# Patient Record
Sex: FEMALE | Race: WHITE | HISPANIC OR LATINO | Employment: FULL TIME | URBAN - METROPOLITAN AREA
[De-identification: names, ages, dates, MRNs, and addresses within clinical notes are randomized per-mention and may not be internally consistent; named-entity substitution may affect disease eponyms.]

---

## 2021-10-19 ENCOUNTER — HOSPITAL ENCOUNTER (EMERGENCY)
Facility: HOSPITAL | Age: 31
Discharge: HOME | End: 2021-10-19
Attending: EMERGENCY MEDICINE

## 2021-10-19 VITALS
SYSTOLIC BLOOD PRESSURE: 126 MMHG | OXYGEN SATURATION: 100 % | TEMPERATURE: 97.3 F | DIASTOLIC BLOOD PRESSURE: 78 MMHG | RESPIRATION RATE: 17 BRPM | WEIGHT: 84.5 LBS | HEART RATE: 98 BPM

## 2021-10-19 DIAGNOSIS — N39.0 UTI (URINARY TRACT INFECTION): Primary | ICD-10-CM

## 2021-10-19 LAB
BACTERIA UR QL AUTO: ABNORMAL /HPF
BILIRUB UR QL STRIP: NEGATIVE
CLARITY UR: CLEAR
COLOR UR: ABNORMAL
EXT PREG TEST URINE: NEGATIVE
EXT. CONTROL ED NAV: NORMAL
GLUCOSE UR STRIP-MCNC: NEGATIVE MG/DL
HGB UR QL STRIP.AUTO: 250
KETONES UR STRIP-MCNC: NEGATIVE MG/DL
LEUKOCYTE ESTERASE UR QL STRIP: NEGATIVE
MUCOUS THREADS UR QL AUTO: ABNORMAL
NITRITE UR QL STRIP: NEGATIVE
NON-SQ EPI CELLS URNS QL MICRO: ABNORMAL /HPF
PH UR STRIP.AUTO: 6 [PH]
PROT UR STRIP-MCNC: NEGATIVE MG/DL
RBC #/AREA URNS AUTO: ABNORMAL /HPF
SP GR UR STRIP.AUTO: 1.02 (ref 1–1.04)
UROBILINOGEN UA: NEGATIVE MG/DL
WBC #/AREA URNS AUTO: ABNORMAL /HPF

## 2021-10-19 PROCEDURE — 81001 URINALYSIS AUTO W/SCOPE: CPT | Performed by: PHYSICIAN ASSISTANT

## 2021-10-19 PROCEDURE — 81003 URINALYSIS AUTO W/O SCOPE: CPT | Performed by: PHYSICIAN ASSISTANT

## 2021-10-19 PROCEDURE — 99284 EMERGENCY DEPT VISIT MOD MDM: CPT | Performed by: PHYSICIAN ASSISTANT

## 2021-10-19 PROCEDURE — 81025 URINE PREGNANCY TEST: CPT | Performed by: PHYSICIAN ASSISTANT

## 2021-10-19 PROCEDURE — 99283 EMERGENCY DEPT VISIT LOW MDM: CPT

## 2021-10-19 RX ORDER — CEPHALEXIN 500 MG/1
500 CAPSULE ORAL EVERY 8 HOURS SCHEDULED
Qty: 30 CAPSULE | Refills: 0 | Status: SHIPPED | OUTPATIENT
Start: 2021-10-19 | End: 2021-10-29

## 2021-12-02 ENCOUNTER — OFFICE VISIT (OUTPATIENT)
Dept: FAMILY MEDICINE CLINIC | Facility: CLINIC | Age: 31
End: 2021-12-02

## 2021-12-02 ENCOUNTER — ANNUAL EXAM (OUTPATIENT)
Dept: OBGYN CLINIC | Facility: CLINIC | Age: 31
End: 2021-12-02

## 2021-12-02 VITALS
HEIGHT: 59 IN | OXYGEN SATURATION: 99 % | DIASTOLIC BLOOD PRESSURE: 70 MMHG | HEART RATE: 90 BPM | SYSTOLIC BLOOD PRESSURE: 110 MMHG | RESPIRATION RATE: 16 BRPM | BODY MASS INDEX: 16.73 KG/M2 | TEMPERATURE: 97.5 F | WEIGHT: 83 LBS

## 2021-12-02 VITALS — HEART RATE: 86 BPM | WEIGHT: 83.4 LBS | DIASTOLIC BLOOD PRESSURE: 73 MMHG | SYSTOLIC BLOOD PRESSURE: 116 MMHG

## 2021-12-02 DIAGNOSIS — Z01.419 ENCOUNTER FOR ANNUAL ROUTINE GYNECOLOGICAL EXAMINATION: Primary | ICD-10-CM

## 2021-12-02 DIAGNOSIS — R63.6 LOW WEIGHT: ICD-10-CM

## 2021-12-02 DIAGNOSIS — Z00.00 ANNUAL PHYSICAL EXAM: ICD-10-CM

## 2021-12-02 DIAGNOSIS — Z80.0 FAMILY HISTORY OF COLON CANCER: Primary | ICD-10-CM

## 2021-12-02 DIAGNOSIS — B37.3 VAGINAL YEAST INFECTION: ICD-10-CM

## 2021-12-02 PROCEDURE — 87624 HPV HI-RISK TYP POOLED RSLT: CPT | Performed by: NURSE PRACTITIONER

## 2021-12-02 PROCEDURE — 99385 PREV VISIT NEW AGE 18-39: CPT | Performed by: NURSE PRACTITIONER

## 2021-12-02 RX ORDER — FLUCONAZOLE 150 MG/1
150 TABLET ORAL ONCE
Qty: 1 TABLET | Refills: 0 | Status: SHIPPED | OUTPATIENT
Start: 2021-12-02 | End: 2021-12-02

## 2021-12-03 ENCOUNTER — APPOINTMENT (OUTPATIENT)
Dept: LAB | Facility: CLINIC | Age: 31
End: 2021-12-03

## 2021-12-03 DIAGNOSIS — R63.6 LOW WEIGHT: ICD-10-CM

## 2021-12-03 LAB
ALBUMIN SERPL BCP-MCNC: 3.7 G/DL (ref 3.5–5)
ALP SERPL-CCNC: 82 U/L (ref 46–116)
ALT SERPL W P-5'-P-CCNC: 22 U/L (ref 12–78)
ANION GAP SERPL CALCULATED.3IONS-SCNC: 6 MMOL/L (ref 4–13)
ARTIFACT: PRESENT
AST SERPL W P-5'-P-CCNC: 15 U/L (ref 5–45)
BASOPHILS # BLD MANUAL: 0 THOUSAND/UL (ref 0–0.1)
BASOPHILS NFR MAR MANUAL: 0 % (ref 0–1)
BILIRUB SERPL-MCNC: 1.11 MG/DL (ref 0.2–1)
BUN SERPL-MCNC: 19 MG/DL (ref 5–25)
CALCIUM SERPL-MCNC: 9 MG/DL (ref 8.3–10.1)
CHLORIDE SERPL-SCNC: 110 MMOL/L (ref 100–108)
CO2 SERPL-SCNC: 24 MMOL/L (ref 21–32)
CREAT SERPL-MCNC: 0.58 MG/DL (ref 0.6–1.3)
EOSINOPHIL # BLD MANUAL: 0 THOUSAND/UL (ref 0–0.4)
EOSINOPHIL NFR BLD MANUAL: 0 % (ref 0–6)
ERYTHROCYTE [DISTWIDTH] IN BLOOD BY AUTOMATED COUNT: 13.2 % (ref 11.6–15.1)
GFR SERPL CREATININE-BSD FRML MDRD: 122 ML/MIN/1.73SQ M
GLUCOSE P FAST SERPL-MCNC: 96 MG/DL (ref 65–99)
HCT VFR BLD AUTO: 46.5 % (ref 34.8–46.1)
HGB BLD-MCNC: 14.7 G/DL (ref 11.5–15.4)
LYMPHOCYTES # BLD AUTO: 0.4 THOUSAND/UL (ref 0.6–4.47)
LYMPHOCYTES # BLD AUTO: 4 % (ref 14–44)
MCH RBC QN AUTO: 30.6 PG (ref 26.8–34.3)
MCHC RBC AUTO-ENTMCNC: 31.6 G/DL (ref 31.4–37.4)
MCV RBC AUTO: 97 FL (ref 82–98)
METAMYELOCYTES NFR BLD MANUAL: 4 % (ref 0–1)
MONOCYTES # BLD AUTO: 0 THOUSAND/UL (ref 0–1.22)
MONOCYTES NFR BLD: 0 % (ref 4–12)
NEUTROPHILS # BLD MANUAL: 9.17 THOUSAND/UL (ref 1.85–7.62)
NEUTS BAND NFR BLD MANUAL: 17 % (ref 0–8)
NEUTS SEG NFR BLD AUTO: 74 % (ref 43–75)
PLATELET # BLD AUTO: 324 THOUSANDS/UL (ref 149–390)
PLATELET BLD QL SMEAR: ADEQUATE
PMV BLD AUTO: 10.3 FL (ref 8.9–12.7)
POTASSIUM SERPL-SCNC: 3.4 MMOL/L (ref 3.5–5.3)
PROT SERPL-MCNC: 7.7 G/DL (ref 6.4–8.2)
RBC # BLD AUTO: 4.81 MILLION/UL (ref 3.81–5.12)
RBC MORPH BLD: NORMAL
SODIUM SERPL-SCNC: 140 MMOL/L (ref 136–145)
TSH SERPL DL<=0.05 MIU/L-ACNC: 0.81 UIU/ML (ref 0.36–3.74)
VARIANT LYMPHS # BLD AUTO: 1 %
WBC # BLD AUTO: 10.08 THOUSAND/UL (ref 4.31–10.16)

## 2021-12-03 PROCEDURE — 85007 BL SMEAR W/DIFF WBC COUNT: CPT

## 2021-12-03 PROCEDURE — 80053 COMPREHEN METABOLIC PANEL: CPT

## 2021-12-03 PROCEDURE — 36415 COLL VENOUS BLD VENIPUNCTURE: CPT

## 2021-12-03 PROCEDURE — 84443 ASSAY THYROID STIM HORMONE: CPT

## 2021-12-03 PROCEDURE — 85027 COMPLETE CBC AUTOMATED: CPT

## 2021-12-09 ENCOUNTER — TELEPHONE (OUTPATIENT)
Dept: OBGYN CLINIC | Facility: CLINIC | Age: 31
End: 2021-12-09

## 2021-12-09 PROBLEM — R87.810 CERVICAL HIGH RISK HUMAN PAPILLOMAVIRUS (HPV) DNA TEST POSITIVE: Status: ACTIVE | Noted: 2021-12-09

## 2021-12-09 LAB
HPV HR 12 DNA CVX QL NAA+PROBE: POSITIVE
HPV16 DNA CVX QL NAA+PROBE: NEGATIVE
HPV18 DNA CVX QL NAA+PROBE: NEGATIVE

## 2022-01-12 ENCOUNTER — TELEPHONE (OUTPATIENT)
Dept: OBGYN CLINIC | Facility: CLINIC | Age: 32
End: 2022-01-12

## 2022-01-12 NOTE — TELEPHONE ENCOUNTER
----- Message from Richa Swan, [address] sent at 12/9/2021  8:14 AM EST -----  12/2/2021 WNLPAP with positive HPV other, needs repeat PAP with HPV co-testing in 1 year

## 2022-04-04 ENCOUNTER — CONSULT (OUTPATIENT)
Dept: GASTROENTEROLOGY | Facility: CLINIC | Age: 32
End: 2022-04-04

## 2022-04-04 VITALS
SYSTOLIC BLOOD PRESSURE: 109 MMHG | BODY MASS INDEX: 16.84 KG/M2 | WEIGHT: 83.4 LBS | DIASTOLIC BLOOD PRESSURE: 75 MMHG | HEART RATE: 102 BPM | TEMPERATURE: 97.6 F

## 2022-04-04 DIAGNOSIS — Z80.0 FAMILY HISTORY OF COLON CANCER: ICD-10-CM

## 2022-04-04 DIAGNOSIS — R63.6 LOW WEIGHT: Primary | ICD-10-CM

## 2022-04-04 PROCEDURE — 99244 OFF/OP CNSLTJ NEW/EST MOD 40: CPT | Performed by: INTERNAL MEDICINE

## 2022-04-04 NOTE — PROGRESS NOTES
[facility] - Outpatient Note  Neto MerrillByron 34 y o  female MRN: [id number]  Encounter: [id number]      ASSESSMENT AND PLAN:    Ashlee Hebert is a 34 y o  old female with family history of colon cancer who presents for family history of colon cancer and low weight    Family history of colon cancer - father diagnosed with stage IV colon cancer at age 61  Given this, usually would recommend colonoscopy at age 35 however she has had inability to gain weight which is concerning for her so plan for EGD and colonoscopy as below  · Colonoscopy as below    Low weight - patient with poor appetite since childhood with inability to get above 90 lb currently her weight is 83 lb with a BMI of 16 8  · Plan for EGD and colonoscopy  Will biopsy for H pylori and celiac disease and evaluate for IBD    Lactose intolerance - patient reports abdominal discomfort and diarrhea occurring with lactose ingestion  · Can use lactase pills as needed      1  Family history of colon cancer  - Ambulatory referral to Gastroenterology    ______________________________________________________________________    SUBJECTIVE:  Ashlee Hebert is a 34 y o  old female with family history of colon cancer who presents for family history of colon cancer and low weight  Unfortunately her follow-up diagnosis stage IV colon cancer age 60 in [facility]   Currently she has no GI symptoms except with lactose ingestion which she gets abdominal bloating, pain and diarrhea  She also reports inability to gain weight ever since childhood and she never gets above 90 lb  No nausea vomiting  No previous endoscopic evaluation  No NSAID use  No significant alcohol or tobacco use history  Does vape  REVIEW OF SYSTEMS IS OTHERWISE NEGATIVE  Historical Information   History reviewed  No pertinent past medical history    Past Surgical History:   Procedure Laterality Date    BLADDER REPAIR      KIDNEY STONE SURGERY      OVARIAN CYST REMOVAL Right      Social History   Social History     Substance and Sexual Activity   Alcohol Use Not Currently     Social History     Substance and Sexual Activity   Drug Use Yes    Types: Marijuana    Comment: social     Social History     Tobacco Use   Smoking Status Current Some Day Smoker   Smokeless Tobacco Never Used   Tobacco Comment    hookah     Family History   Problem Relation Age of Onset    Diabetes Father        Meds/Allergies     No current outpatient medications on file  No Known Allergies        Objective     Blood pressure 109/75, pulse 102, temperature 97 6 °F (36 4 °C), weight 37 8 kg (83 lb 6 4 oz), not currently breastfeeding  Body mass index is 16 84 kg/m²  PHYSICAL EXAM:      General Appearance:   Alert, cooperative, no distress   HEENT:   Normocephalic, atraumatic, anicteric  Neck:  Supple, symmetrical, trachea midline   Lungs:   Clear to auscultation bilaterally; no rales, rhonchi or wheezing; respirations unlabored    Heart[de-identified]   Regular rate and rhythm; no murmur, rub, or gallop  Abdomen:   Soft, non-tender, non-distended; normal bowel sounds; no masses, no organomegaly    Genitalia:   Deferred    Rectal:   Deferred    Extremities:  No cyanosis, clubbing or edema    Pulses:  2+ and symmetric    Skin:  No jaundice, rashes, or lesions    Lymph nodes:  No palpable cervical lymphadenopathy        Lab Results:   No visits with results within 1 Day(s) from this visit     Latest known visit with results is:   Appointment on 12/03/2021   Component Date Value    WBC 12/03/2021 10 08     RBC 12/03/2021 4 81     Hemoglobin 12/03/2021 14 7     Hematocrit 12/03/2021 46 5*    MCV 12/03/2021 97     MCH 12/03/2021 30 6     MCHC 12/03/2021 31 6     RDW 12/03/2021 13 2     MPV 12/03/2021 10 3     Platelets 77/40/3338 324     Sodium 12/03/2021 140     Potassium 12/03/2021 3 4*    Chloride 12/03/2021 110*    CO2 12/03/2021 24     ANION GAP 12/03/2021 6  BUN 12/03/2021 19     Creatinine 12/03/2021 0 58*    Glucose, Fasting 12/03/2021 96     Calcium 12/03/2021 9 0     AST 12/03/2021 15     ALT 12/03/2021 22     Alkaline Phosphatase 12/03/2021 82     Total Protein 12/03/2021 7 7     Albumin 12/03/2021 3 7     Total Bilirubin 12/03/2021 1 11*    eGFR 12/03/2021 122     TSH 3RD GENERATON 12/03/2021 0 808     Segmented % 12/03/2021 74     Bands % 12/03/2021 17*    Lymphocytes % 12/03/2021 4*    Monocytes % 12/03/2021 0*    Eosinophils, % 12/03/2021 0     Basophils % 12/03/2021 0     Metamyelocytes% 12/03/2021 4*    Atypical Lymphocytes % 12/03/2021 1*    Absolute Neutrophils 12/03/2021 9 17*    Lymphocytes Absolute 12/03/2021 0 40*    Monocytes Absolute 12/03/2021 0 00     Eosinophils Absolute 12/03/2021 0 00     Basophils Absolute 12/03/2021 0 00     RBC Morphology 12/03/2021 Normal     Platelet Estimate 29/66/1588 Adequate     Artifact 12/03/2021 Present          Radiology Results:   No results found

## 2022-04-24 ENCOUNTER — HOSPITAL ENCOUNTER (EMERGENCY)
Facility: HOSPITAL | Age: 32
Discharge: HOME | End: 2022-04-24
Attending: EMERGENCY MEDICINE

## 2022-04-24 VITALS
OXYGEN SATURATION: 100 % | BODY MASS INDEX: 16.74 KG/M2 | WEIGHT: 82.89 LBS | SYSTOLIC BLOOD PRESSURE: 128 MMHG | RESPIRATION RATE: 12 BRPM | HEART RATE: 88 BPM | DIASTOLIC BLOOD PRESSURE: 78 MMHG | TEMPERATURE: 98.9 F

## 2022-04-24 DIAGNOSIS — N39.0 UTI (URINARY TRACT INFECTION): Primary | ICD-10-CM

## 2022-04-24 DIAGNOSIS — R63.6 LOW WEIGHT: ICD-10-CM

## 2022-04-24 LAB
BACTERIA UR QL AUTO: ABNORMAL /HPF
BILIRUB UR QL STRIP: NEGATIVE
CLARITY UR: ABNORMAL
COLOR UR: ABNORMAL
EXT PREG TEST URINE: NEGATIVE
EXT. CONTROL ED NAV: NORMAL
GLUCOSE UR STRIP-MCNC: NEGATIVE MG/DL
HGB UR QL STRIP.AUTO: 250
KETONES UR STRIP-MCNC: NEGATIVE MG/DL
LEUKOCYTE ESTERASE UR QL STRIP: 500
NITRITE UR QL STRIP: NEGATIVE
NON-SQ EPI CELLS URNS QL MICRO: ABNORMAL /HPF
PH UR STRIP.AUTO: 6 [PH]
PROT UR STRIP-MCNC: ABNORMAL MG/DL
RBC #/AREA URNS AUTO: ABNORMAL /HPF
SP GR UR STRIP.AUTO: 1.01 (ref 1–1.04)
UROBILINOGEN UA: NEGATIVE MG/DL
WBC #/AREA URNS AUTO: ABNORMAL /HPF

## 2022-04-24 PROCEDURE — 99283 EMERGENCY DEPT VISIT LOW MDM: CPT

## 2022-04-24 PROCEDURE — 99284 EMERGENCY DEPT VISIT MOD MDM: CPT | Performed by: EMERGENCY MEDICINE

## 2022-04-24 PROCEDURE — 81001 URINALYSIS AUTO W/SCOPE: CPT | Performed by: PHYSICIAN ASSISTANT

## 2022-04-24 PROCEDURE — 81025 URINE PREGNANCY TEST: CPT | Performed by: PHYSICIAN ASSISTANT

## 2022-04-24 RX ORDER — PHENAZOPYRIDINE HYDROCHLORIDE 200 MG/1
200 TABLET, FILM COATED ORAL 3 TIMES DAILY
Qty: 6 TABLET | Refills: 0 | Status: SHIPPED | OUTPATIENT
Start: 2022-04-24

## 2022-04-24 RX ORDER — CEPHALEXIN 500 MG/1
500 CAPSULE ORAL ONCE
Status: COMPLETED | OUTPATIENT
Start: 2022-04-24 | End: 2022-04-24

## 2022-04-24 RX ORDER — CEPHALEXIN 500 MG/1
500 CAPSULE ORAL EVERY 6 HOURS SCHEDULED
Qty: 20 CAPSULE | Refills: 0 | Status: SHIPPED | OUTPATIENT
Start: 2022-04-24 | End: 2022-04-29

## 2022-04-24 RX ORDER — ACETAMINOPHEN 325 MG/1
650 TABLET ORAL ONCE
Status: COMPLETED | OUTPATIENT
Start: 2022-04-24 | End: 2022-04-24

## 2022-04-24 RX ORDER — PHENAZOPYRIDINE HYDROCHLORIDE 100 MG/1
100 TABLET, FILM COATED ORAL ONCE
Status: COMPLETED | OUTPATIENT
Start: 2022-04-24 | End: 2022-04-24

## 2022-04-24 RX ADMIN — PHENAZOPYRIDINE HYDROCHLORIDE 100 MG: 100 TABLET ORAL at 21:14

## 2022-04-24 RX ADMIN — ACETAMINOPHEN 650 MG: 325 TABLET ORAL at 21:14

## 2022-04-24 RX ADMIN — CEPHALEXIN 500 MG: 500 CAPSULE ORAL at 22:19

## 2022-04-24 NOTE — Clinical Note
Isabel Annelise was seen and treated in our emergency department on 4/24/2022  Diagnosis:     Kolby Gilmore  may return to work on return date  She may return on this date: 04/26/2022         If you have any questions or concerns, please don't hesitate to call        Anabell Garcia RN    ______________________________           _______________          _______________  Hospital Representative                              Date                                Time

## 2022-04-25 NOTE — ED PROVIDER NOTES
History  Chief Complaint   Patient presents with    Possible UTI     PT reports urinary frequency and burning when finsihing urinating  Pt states she also noticed a small amount of blood in urine appr  30 minutes ago  Denies any lower quadrant pain  Patient is a 76-year-old female here with family helps provide history  Patient Kiswahili-speaking only  Patient comes in today with urinary frequency, burning with urination  She did notice some mild hematuria this morning  She has suprapubic discomfort  She has no fevers, chills, nausea, vomiting  She has no diarrhea  She did not take anything for this  History provided by:  Patient and relative   used: Yes    Urinary Frequency  Severity:  Moderate  Onset quality:  Gradual  Timing:  Constant  Progression:  Unchanged  Chronicity:  Recurrent  Associated symptoms: abdominal pain    Associated symptoms: no chest pain, no congestion, no cough, no ear pain, no fever, no myalgias, no nausea, no rash, no shortness of breath, no sore throat and no vomiting    Abdominal pain:     Location:  Suprapubic    Quality: aching, pressure and sharp      Severity:  Mild    Onset quality:  Gradual    Timing:  Intermittent    Progression:  Waxing and waning    Chronicity:  Recurrent      None       History reviewed  No pertinent past medical history  Past Surgical History:   Procedure Laterality Date    BLADDER REPAIR      KIDNEY STONE SURGERY      OVARIAN CYST REMOVAL Right        Family History   Problem Relation Age of Onset    Diabetes Father      I have reviewed and agree with the history as documented      E-Cigarette/Vaping     E-Cigarette/Vaping Substances    Nicotine No     THC No     CBD No     Flavoring No     Other No     Unknown No      Social History     Tobacco Use    Smoking status: Current Some Day Smoker    Smokeless tobacco: Never Used    Tobacco comment: hookah   Vaping Use    Vaping Use: Not on file   Substance Use Topics    Alcohol use: Not Currently    Drug use: Yes     Types: Marijuana     Comment: social       Review of Systems   Constitutional: Negative  Negative for chills and fever  HENT: Negative  Negative for congestion, ear pain and sore throat  Eyes: Negative  Negative for pain and visual disturbance  Respiratory: Negative  Negative for cough and shortness of breath  Cardiovascular: Negative  Negative for chest pain and palpitations  Gastrointestinal: Positive for abdominal pain  Negative for nausea and vomiting  Endocrine: Negative  Genitourinary: Positive for frequency  Negative for dysuria and hematuria  Musculoskeletal: Negative  Negative for arthralgias, back pain and myalgias  Skin: Negative  Negative for color change and rash  Neurological: Negative  Negative for seizures and syncope  Hematological: Negative  Psychiatric/Behavioral: Negative  All other systems reviewed and are negative  Physical Exam  Physical Exam  Vitals and nursing note reviewed  Constitutional:       General: She is not in acute distress  Appearance: She is well-developed  HENT:      Head: Normocephalic and atraumatic  Comments: Patient maintaining airway and secretions  No stridor   No brawniness under tongue  Eyes:      Extraocular Movements: Extraocular movements intact  Conjunctiva/sclera: Conjunctivae normal       Pupils: Pupils are equal, round, and reactive to light  Cardiovascular:      Rate and Rhythm: Normal rate and regular rhythm  Heart sounds: No murmur heard  Pulmonary:      Effort: Pulmonary effort is normal  No respiratory distress  Breath sounds: Normal breath sounds  Abdominal:      Palpations: Abdomen is soft  Tenderness: There is abdominal tenderness in the suprapubic area  There is no right CVA tenderness, left CVA tenderness, guarding or rebound  Negative signs include Polanco's sign and Rovsing's sign     Musculoskeletal: Cervical back: Neck supple  Skin:     General: Skin is warm and dry  Neurological:      General: No focal deficit present  Mental Status: She is alert and oriented to person, place, and time  GCS: GCS eye subscore is 4  GCS verbal subscore is 5  GCS motor subscore is 6  Cranial Nerves: Cranial nerves are intact  Sensory: Sensation is intact  Motor: Motor function is intact  Coordination: Coordination is intact  Gait: Gait is intact  Psychiatric:         Attention and Perception: Attention and perception normal          Mood and Affect: Mood normal          Behavior: Behavior normal          Thought Content:  Thought content normal          Judgment: Judgment normal          Vital Signs  ED Triage Vitals [04/24/22 2033]   Temperature Pulse Respirations Blood Pressure SpO2   98 9 °F (37 2 °C) 88 12 128/78 100 %      Temp Source Heart Rate Source Patient Position - Orthostatic VS BP Location FiO2 (%)   Tympanic Monitor Sitting Left arm --      Pain Score       --           Vitals:    04/24/22 2033   BP: 128/78   Pulse: 88   Patient Position - Orthostatic VS: Sitting         Visual Acuity      ED Medications  Medications   cephalexin (KEFLEX) capsule 500 mg (has no administration in time range)   acetaminophen (TYLENOL) tablet 650 mg (650 mg Oral Given 4/24/22 2114)   phenazopyridine (PYRIDIUM) tablet 100 mg (100 mg Oral Given 4/24/22 2114)       Diagnostic Studies  Results Reviewed     Procedure Component Value Units Date/Time    Urine Microscopic [[id number]]  (Abnormal) Collected: 04/24/22 2142    Lab Status: Final result Specimen: Urine, Clean Catch Updated: 04/24/22 2206     RBC, UA 4-10 /hpf      WBC, UA Innumerable /hpf      Epithelial Cells Occasional /hpf      Bacteria, UA Moderate /hpf     UA (URINE) with reflex to Scope [[id number]]  (Abnormal) Collected: 04/24/22 2142    Lab Status: Final result Specimen: Urine, Clean Catch Updated: 04/24/22 2152     Color, UA Straw Clarity, UA Slightly Cloudy     Specific [place], UA 1 010     pH, UA 6 0     Leukocytes,  0     Nitrite, UA Negative     Protein, UA 30 (1+) mg/dl      Glucose, UA Negative mg/dl      Ketones, UA Negative mg/dl      Bilirubin, UA Negative     Blood,  0     UROBILINOGEN UA Negative mg/dL     POCT pregnancy, urine [[id number]]  (Normal) Resulted: 04/24/22 2142    Lab Status: Final result Updated: 04/24/22 2142     EXT PREG TEST UR (Ref: Negative) Negative     Control Valid                 No orders to display              Procedures  Procedures         ED Course  ED Course as of 04/24/22 2215   Sun Apr 24, 2022 2109 Patient is a 65-year-old female coming in today with burning discomfort with urination as well as some hematuria  On exam well appearing in no acute distress however tender to suprapubic region  Family at bedside helps translate  Understands that the side effects of Pyridium  Will give Tylenol well waiting for urine as well      Portions of the record may have been created with voice recognition software  Occasional wrong word or "sound a like" substitutions may have occurred due to the inherent limitations of voice recognition software  Read the chart carefully and recognize, using context, where substitutions have occurred  2152 Pregnancy negative  Positive leukocytes and blood  Pending microscopic   2209 Patient with wbc's as well as bacteria  Will treat for infection  Patient and friend updated  Will DC home                               SBIRT 20yo+      Most Recent Value   SBIRT (23 yo +)    In order to provide better care to our patients, we are screening all of our patients for alcohol and drug use  Would it be okay to ask you these screening questions?  No Filed at: 04/24/2022 2107                    MDM  Number of Diagnoses or Management Options     Amount and/or Complexity of Data Reviewed  Clinical lab tests: ordered and reviewed  Tests in the medicine section of CPT®: ordered and reviewed        Disposition  Final diagnoses:   UTI (urinary tract infection)   Low weight     Time reflects when diagnosis was documented in both MDM as applicable and the Disposition within this note     Time User Action Codes Description Comment    4/24/2022 10:10 PM KatherinRocío Add [N39 0] UTI (urinary tract infection)     4/24/2022 10:10 PM Eryn Pandeye Shelbi Add [R63 6] Low weight       ED Disposition     ED Disposition Condition Date/Time Comment    Discharge Stable Sun Apr 24, 2022 10:09 PM Paul Quijano 45 discharge to home/self care  Follow-up Information     Follow up With Specialties Details Why Contact Info Additional [address] [facility] Schedule an appointment as soon as possible for a visit in 1 week  [address], [address] [facility] [id number]  [address] [place] [address], [address], [address], [address] Nurse Practitioner   [address] [place] [address]  Edgar Valdez   [address]  [facility] 43              Patient's Medications   Discharge Prescriptions    CEPHALEXIN (KEFLEX) 500 MG CAPSULE    Take 1 capsule (500 mg total) by mouth every 6 (six) hours for 5 days       Start Date: 4/24/2022 End Date: 4/29/2022       Order Dose: 500 mg       Quantity: 20 capsule    Refills: 0    PHENAZOPYRIDINE (PYRIDIUM) 200 MG TABLET    Take 1 tablet (200 mg total) by mouth 3 (three) times a day       Start Date: 4/24/2022 End Date: --       Order Dose: 200 mg       Quantity: 6 tablet    Refills: 0       No discharge procedures on file      PDMP Review     None          ED Provider  Electronically Signed by           Tsering Michelle DO  04/24/22 4879

## 2022-05-12 ENCOUNTER — HOSPITAL ENCOUNTER (EMERGENCY)
Facility: HOSPITAL | Age: 32
Discharge: HOME | End: 2022-05-12
Attending: EMERGENCY MEDICINE

## 2022-05-12 VITALS
DIASTOLIC BLOOD PRESSURE: 58 MMHG | WEIGHT: 94.5 LBS | TEMPERATURE: 98 F | SYSTOLIC BLOOD PRESSURE: 113 MMHG | RESPIRATION RATE: 16 BRPM | HEART RATE: 97 BPM | OXYGEN SATURATION: 100 % | BODY MASS INDEX: 19.09 KG/M2

## 2022-05-12 DIAGNOSIS — R11.2 NAUSEA VOMITING AND DIARRHEA: ICD-10-CM

## 2022-05-12 DIAGNOSIS — R19.7 NAUSEA VOMITING AND DIARRHEA: ICD-10-CM

## 2022-05-12 DIAGNOSIS — R42 LIGHTHEADEDNESS: Primary | ICD-10-CM

## 2022-05-12 DIAGNOSIS — R10.9 ABDOMINAL PAIN: ICD-10-CM

## 2022-05-12 LAB
ALBUMIN SERPL BCP-MCNC: 4.2 G/DL (ref 3–5.2)
ALP SERPL-CCNC: 87 U/L (ref 43–122)
ALT SERPL W P-5'-P-CCNC: 95 U/L
ANION GAP SERPL CALCULATED.3IONS-SCNC: 6 MMOL/L (ref 5–14)
AST SERPL W P-5'-P-CCNC: 52 U/L (ref 14–36)
ATRIAL RATE: 96 BPM
BACTERIA UR QL AUTO: ABNORMAL /HPF
BASOPHILS # BLD AUTO: 0.04 THOUSANDS/ΜL (ref 0–0.1)
BASOPHILS NFR BLD AUTO: 1 % (ref 0–1)
BILIRUB SERPL-MCNC: 0.91 MG/DL
BILIRUB UR QL STRIP: NEGATIVE
BUN SERPL-MCNC: 17 MG/DL (ref 5–25)
CALCIUM SERPL-MCNC: 9.2 MG/DL (ref 8.4–10.2)
CHLORIDE SERPL-SCNC: 106 MMOL/L (ref 97–108)
CLARITY UR: CLEAR
CO2 SERPL-SCNC: 27 MMOL/L (ref 22–30)
COLOR UR: YELLOW
CREAT SERPL-MCNC: 0.4 MG/DL (ref 0.6–1.2)
EOSINOPHIL # BLD AUTO: 0.02 THOUSAND/ΜL (ref 0–0.61)
EOSINOPHIL NFR BLD AUTO: 0 % (ref 0–6)
ERYTHROCYTE [DISTWIDTH] IN BLOOD BY AUTOMATED COUNT: 12.7 % (ref 11.6–15.1)
EXT PREG TEST URINE: NEGATIVE
EXT. CONTROL ED NAV: NORMAL
FLUAV RNA RESP QL NAA+PROBE: NEGATIVE
FLUBV RNA RESP QL NAA+PROBE: NEGATIVE
GFR SERPL CREATININE-BSD FRML MDRD: 137 ML/MIN/1.73SQ M
GLUCOSE SERPL-MCNC: 115 MG/DL (ref 70–99)
GLUCOSE UR STRIP-MCNC: NEGATIVE MG/DL
HCT VFR BLD AUTO: 40.1 % (ref 34.8–46.1)
HGB BLD-MCNC: 12.8 G/DL (ref 11.5–15.4)
HGB UR QL STRIP.AUTO: 150
IMM GRANULOCYTES # BLD AUTO: 0.03 THOUSAND/UL (ref 0–0.2)
IMM GRANULOCYTES NFR BLD AUTO: 0 % (ref 0–2)
KETONES UR STRIP-MCNC: NEGATIVE MG/DL
LEUKOCYTE ESTERASE UR QL STRIP: NEGATIVE
LIPASE SERPL-CCNC: 131 U/L (ref 23–300)
LYMPHOCYTES # BLD AUTO: 1.88 THOUSANDS/ΜL (ref 0.6–4.47)
LYMPHOCYTES NFR BLD AUTO: 24 % (ref 14–44)
MCH RBC QN AUTO: 30.2 PG (ref 26.8–34.3)
MCHC RBC AUTO-ENTMCNC: 31.9 G/DL (ref 31.4–37.4)
MCV RBC AUTO: 95 FL (ref 82–98)
MONOCYTES # BLD AUTO: 0.44 THOUSAND/ΜL (ref 0.17–1.22)
MONOCYTES NFR BLD AUTO: 6 % (ref 4–12)
MUCOUS THREADS UR QL AUTO: ABNORMAL
NEUTROPHILS # BLD AUTO: 5.44 THOUSANDS/ΜL (ref 1.85–7.62)
NEUTS SEG NFR BLD AUTO: 69 % (ref 43–75)
NITRITE UR QL STRIP: NEGATIVE
NON-SQ EPI CELLS URNS QL MICRO: ABNORMAL /HPF
NRBC BLD AUTO-RTO: 0 /100 WBCS
P AXIS: 62 DEGREES
PH UR STRIP.AUTO: 6 [PH]
PLATELET # BLD AUTO: 335 THOUSANDS/UL (ref 149–390)
PMV BLD AUTO: 9.6 FL (ref 8.9–12.7)
POTASSIUM SERPL-SCNC: 4.7 MMOL/L (ref 3.6–5)
PR INTERVAL: 120 MS
PROT SERPL-MCNC: 7.8 G/DL (ref 5.9–8.4)
PROT UR STRIP-MCNC: NEGATIVE MG/DL
QRS AXIS: 81 DEGREES
QRSD INTERVAL: 76 MS
QT INTERVAL: 318 MS
QTC INTERVAL: 401 MS
RBC # BLD AUTO: 4.24 MILLION/UL (ref 3.81–5.12)
RBC #/AREA URNS AUTO: ABNORMAL /HPF
RSV RNA RESP QL NAA+PROBE: NEGATIVE
SARS-COV-2 RNA RESP QL NAA+PROBE: NEGATIVE
SODIUM SERPL-SCNC: 139 MMOL/L (ref 137–147)
SP GR UR STRIP.AUTO: 1.02 (ref 1–1.04)
T WAVE AXIS: 69 DEGREES
UROBILINOGEN UA: NEGATIVE MG/DL
VENTRICULAR RATE: 96 BPM
WBC # BLD AUTO: 7.85 THOUSAND/UL (ref 4.31–10.16)
WBC #/AREA URNS AUTO: ABNORMAL /HPF

## 2022-05-12 PROCEDURE — 99284 EMERGENCY DEPT VISIT MOD MDM: CPT

## 2022-05-12 PROCEDURE — 93010 ELECTROCARDIOGRAM REPORT: CPT | Performed by: INTERNAL MEDICINE

## 2022-05-12 PROCEDURE — 85025 COMPLETE CBC W/AUTO DIFF WBC: CPT | Performed by: EMERGENCY MEDICINE

## 2022-05-12 PROCEDURE — 99285 EMERGENCY DEPT VISIT HI MDM: CPT | Performed by: EMERGENCY MEDICINE

## 2022-05-12 PROCEDURE — 80053 COMPREHEN METABOLIC PANEL: CPT | Performed by: EMERGENCY MEDICINE

## 2022-05-12 PROCEDURE — 83690 ASSAY OF LIPASE: CPT | Performed by: EMERGENCY MEDICINE

## 2022-05-12 PROCEDURE — 96361 HYDRATE IV INFUSION ADD-ON: CPT

## 2022-05-12 PROCEDURE — 93005 ELECTROCARDIOGRAM TRACING: CPT

## 2022-05-12 PROCEDURE — 81025 URINE PREGNANCY TEST: CPT | Performed by: EMERGENCY MEDICINE

## 2022-05-12 PROCEDURE — 36415 COLL VENOUS BLD VENIPUNCTURE: CPT | Performed by: EMERGENCY MEDICINE

## 2022-05-12 PROCEDURE — 96374 THER/PROPH/DIAG INJ IV PUSH: CPT

## 2022-05-12 PROCEDURE — 81001 URINALYSIS AUTO W/SCOPE: CPT | Performed by: EMERGENCY MEDICINE

## 2022-05-12 PROCEDURE — 0241U HB NFCT DS VIR RESP RNA 4 TRGT: CPT | Performed by: EMERGENCY MEDICINE

## 2022-05-12 RX ORDER — ONDANSETRON 4 MG/1
4 TABLET, FILM COATED ORAL EVERY 6 HOURS
Qty: 12 TABLET | Refills: 0 | Status: SHIPPED | OUTPATIENT
Start: 2022-05-12

## 2022-05-12 RX ORDER — ONDANSETRON 2 MG/ML
4 INJECTION INTRAMUSCULAR; INTRAVENOUS ONCE
Status: COMPLETED | OUTPATIENT
Start: 2022-05-12 | End: 2022-05-12

## 2022-05-12 RX ADMIN — ONDANSETRON 4 MG: 2 INJECTION INTRAMUSCULAR; INTRAVENOUS at 11:46

## 2022-05-12 RX ADMIN — SODIUM CHLORIDE 1000 ML: 9 INJECTION, SOLUTION INTRAVENOUS at 11:46

## 2022-05-12 NOTE — ED PROVIDER NOTES
History  Chief Complaint   Patient presents with    Dizziness     About a few week dizzy, weak, fatigue, aching in pelvic area, n/v, last vomit last night     Patient is a 58-year-old female, complex abdominal history which includes bladder repair secondary to kidney stone, ovarian cyst removal complication  She states starting last night she had acute nausea vomiting  Last episode was around 1:00 a m  this morning  No fevers no chills no sick contacts no recent travel  She states she has had similar symptoms in the past associated with ovarian cyst   She also admits to history of an ectopic pregnancy  No focal abdominal pain, just generalized lower abdominal soreness  No vaginal discharge or bleeding at this time  Nothing makes her symptoms significantly better or worse  No medications taken prior to arrival   Patient is Welsh-speaking, she was offered  service, she states that her friend/roommate at the bedside would be her preferred   Prior to Admission Medications   Prescriptions Last Dose Informant Patient Reported? Taking? phenazopyridine (PYRIDIUM) 200 mg tablet   No No   Sig: Take 1 tablet (200 mg total) by mouth 3 (three) times a day      Facility-Administered Medications: None       History reviewed  No pertinent past medical history  Past Surgical History:   Procedure Laterality Date    BLADDER REPAIR      KIDNEY STONE SURGERY      OVARIAN CYST REMOVAL Right        Family History   Problem Relation Age of Onset    Diabetes Father      I have reviewed and agree with the history as documented      E-Cigarette/Vaping     E-Cigarette/Vaping Substances    Nicotine No     THC No     CBD No     Flavoring No     Other No     Unknown No      Social History     Tobacco Use    Smoking status: Current Some Day Smoker    Smokeless tobacco: Never Used    Tobacco comment: hookah   Substance Use Topics    Alcohol use: Not Currently    Drug use: Yes     Types: Marijuana     Comment: social       Review of Systems   Constitutional: Negative  Negative for chills and fever  HENT: Negative  Negative for rhinorrhea, sore throat, trouble swallowing and voice change  Eyes: Negative  Negative for pain and visual disturbance  Respiratory: Negative  Negative for cough, shortness of breath and wheezing  Cardiovascular: Negative  Negative for chest pain and palpitations  Gastrointestinal: Positive for abdominal pain, diarrhea, nausea and vomiting  Genitourinary: Negative  Negative for dysuria and frequency  Musculoskeletal: Negative  Negative for neck pain and neck stiffness  Skin: Negative  Negative for rash  Neurological: Negative  Negative for dizziness, speech difficulty, weakness, light-headedness and numbness  Physical Exam  Physical Exam  Vitals and nursing note reviewed  Constitutional:       General: She is not in acute distress  Appearance: She is well-developed  HENT:      Head: Normocephalic and atraumatic  Eyes:      Conjunctiva/sclera: Conjunctivae normal       Pupils: Pupils are equal, round, and reactive to light  Neck:      Trachea: No tracheal deviation  Cardiovascular:      Rate and Rhythm: Normal rate and regular rhythm  Pulmonary:      Effort: Pulmonary effort is normal  No respiratory distress  Breath sounds: Normal breath sounds  No wheezing or rales  Abdominal:      General: Bowel sounds are normal  There is no distension  Palpations: Abdomen is soft  Tenderness: There is no abdominal tenderness  There is no guarding or rebound  Musculoskeletal:         General: No tenderness or deformity  Normal range of motion  Cervical back: Normal range of motion and neck supple  Skin:     General: Skin is warm and dry  Capillary Refill: Capillary refill takes less than 2 seconds  Findings: No rash  Neurological:      Mental Status: She is alert and oriented to person, place, and time  Psychiatric:         Behavior: Behavior normal          Vital Signs  ED Triage Vitals [05/12/22 1047]   Temperature Pulse Respirations Blood Pressure SpO2   98 °F (36 7 °C) 97 16 113/58 100 %      Temp Source Heart Rate Source Patient Position - Orthostatic VS BP Location FiO2 (%)   Tympanic Monitor Lying Left arm --      Pain Score       --           Vitals:    05/12/22 1047   BP: 113/58   Pulse: 97   Patient Position - Orthostatic VS: Lying         Visual Acuity      ED Medications  Medications   sodium chloride 0 9 % bolus 1,000 mL (0 mL Intravenous Stopped 5/12/22 1321)   ondansetron (ZOFRAN) injection 4 mg (4 mg Intravenous Given 5/12/22 1146)       Diagnostic Studies  Results Reviewed     Procedure Component Value Units Date/Time    Urine Microscopic [[id number]]  (Abnormal) Collected: 05/12/22 1137    Lab Status: Final result Specimen: Urine, Other Updated: 05/12/22 1251     RBC, UA 4-10 /hpf      WBC, UA 0-1 /hpf      Epithelial Cells Moderate /hpf      Bacteria, UA Occasional /hpf      MUCUS THREADS Occasional    COVID/FLU/RSV - 2 hour TAT [[id number]]  (Normal) Collected: 05/12/22 1138    Lab Status: Final result Specimen: Nares from Nose Updated: 05/12/22 1235     SARS-CoV-2 Negative     INFLUENZA A PCR Negative     INFLUENZA B PCR Negative     RSV PCR Negative    Narrative:      FOR PEDIATRIC PATIENTS - copy/paste COVID Guidelines URL to browser: [email or web address]    SARS-CoV-2 assay is a Nucleic Acid Amplification assay intended for the  qualitative detection of nucleic acid from SARS-CoV-2 in nasopharyngeal  swabs  Results are for the presumptive identification of SARS-CoV-2 RNA  Positive results are indicative of infection with SARS-CoV-2, the virus  causing COVID-19, but do not rule out bacterial infection or co-infection  with other viruses   Laboratories within the [place] and its  territories are required to report all positive results to the appropriate  public health authorities  Negative results do not preclude SARS-CoV-2  infection and should not be used as the sole basis for treatment or other  patient management decisions  Negative results must be combined with  clinical observations, patient history, and epidemiological information  This test has not been FDA cleared or approved  This test has been authorized by FDA under an Emergency Use Authorization  (EUA)  This test is only authorized for the duration of time the  declaration that circumstances exist justifying the authorization of the  emergency use of an in vitro diagnostic tests for detection of SARS-CoV-2  virus and/or diagnosis of COVID-19 infection under section 564(b)(1) of  the Act, 21 U  S C  171FQG-0(I)(5), unless the authorization is terminated  or revoked sooner  The test has been validated but independent review by FDA  and CLIA is pending  Test performed using [employer] GeneXpert: This RT-PCR assay targets N2,  a region unique to SARS-CoV-2   A conserved region in the E-gene was chosen  for pan-Sarbecovirus detection which includes SARS-CoV-2     UA (URINE) with reflex to Scope [[id number]]  (Abnormal) Collected: 05/12/22 1137    Lab Status: Final result Specimen: Urine, Other Updated: 05/12/22 1212     Color, UA Yellow     Clarity, UA Clear     Specific [place], UA 1 020     pH, UA 6 0     Leukocytes, UA Negative     Nitrite, UA Negative     Protein, UA Negative mg/dl      Glucose, UA Negative mg/dl      Ketones, UA Negative mg/dl      Bilirubin, UA Negative     Blood,  0     UROBILINOGEN UA Negative mg/dL     Lipase [[id number]]  (Normal) Collected: 05/12/22 1145    Lab Status: Final result Specimen: Blood from Arm, Right Updated: 05/12/22 1212     Lipase 131 u/L     Narrative:      Hemolysis    Comprehensive metabolic panel [[id number]]  (Abnormal) Collected: 05/12/22 1145    Lab Status: Final result Specimen: Blood from Arm, Right Updated: 05/12/22 1211 Sodium 139 mmol/L      Potassium 4 7 mmol/L      Chloride 106 mmol/L      CO2 27 mmol/L      ANION GAP 6 mmol/L      BUN 17 mg/dL      Creatinine 0 40 mg/dL      Glucose 115 mg/dL      Calcium 9 2 mg/dL      AST 52 U/L      ALT 95 U/L      Alkaline Phosphatase 87 U/L      Total Protein 7 8 g/dL      Albumin 4 2 g/dL      Total Bilirubin 0 91 mg/dL      eGFR 137 ml/min/1 73sq m     Narrative:      Hemolysis  National Kidney Disease Foundation guidelines for Chronic Kidney Disease (CKD):     Stage 1 with normal or high GFR (GFR > 90 mL/min/1 73 square meters)    Stage 2 Mild CKD (GFR = 60-89 mL/min/1 73 square meters)    Stage 3A Moderate CKD (GFR = 45-59 mL/min/1 73 square meters)    Stage 3B Moderate CKD (GFR = 30-44 mL/min/1 73 square meters)    Stage 4 Severe CKD (GFR = 15-29 mL/min/1 73 square meters)    Stage 5 End Stage CKD (GFR <15 mL/min/1 73 square meters)  Note: GFR calculation is accurate only with a steady state creatinine    CBC and differential [[id number]] Collected: 05/12/22 1145    Lab Status: Final result Specimen: Blood from Arm, Right Updated: 05/12/22 1155     WBC 7 85 Thousand/uL      RBC 4 24 Million/uL      Hemoglobin 12 8 g/dL      Hematocrit 40 1 %      MCV 95 fL      MCH 30 2 pg      MCHC 31 9 g/dL      RDW 12 7 %      MPV 9 6 fL      Platelets 099 Thousands/uL      nRBC 0 /100 WBCs      Neutrophils Relative 69 %      Immat GRANS % 0 %      Lymphocytes Relative 24 %      Monocytes Relative 6 %      Eosinophils Relative 0 %      Basophils Relative 1 %      Neutrophils Absolute 5 44 Thousands/µL      Immature Grans Absolute 0 03 Thousand/uL      Lymphocytes Absolute 1 88 Thousands/µL      Monocytes Absolute 0 44 Thousand/µL      Eosinophils Absolute 0 02 Thousand/µL      Basophils Absolute 0 04 Thousands/µL     POCT pregnancy, urine [[id number]]  (Normal) Resulted: 05/12/22 1136    Lab Status: Final result Updated: 05/12/22 1136     EXT PREG TEST UR (Ref: Negative) negative Control valid                 No orders to display              Procedures  Procedures         ED Course  ED Course as of 05/12/22 1433   Thu May 12, 2022   1055 Procedure Note: EKG  Date/Time: 05/12/22 10:55 AM   Performed by: Luis Wang  Authorized by: Luis Wang  ECG interpreted by me, the ED Provider: yes   The EKG demonstrates:  Rate 96  Rhythm sinus  QTc 401  No ST elevations/depressions                                   SBIRT 22yo+    Flowsheet Row Most Recent Value   SBIRT (25 yo +)    In order to provide better care to our patients, we are screening all of our patients for alcohol and drug use  Would it be okay to ask you these screening questions? No Filed at: 05/12/2022 1148                    MDM  Number of Diagnoses or Management Options  Abdominal pain  Lightheadedness  Nausea vomiting and diarrhea  Diagnosis management comments: I have reviewed the patient's visit and any testing done in the emergency department  They have verbalized their understanding of any testing done today and have no further questions or concerns regarding their care in the emergency room  They will follow up with their primary care physician as well as with any specialist in their discharge instructions  Strict return precautions were discussed         Amount and/or Complexity of Data Reviewed  Clinical lab tests: ordered and reviewed        Disposition  Final diagnoses:   Lightheadedness   Abdominal pain   Nausea vomiting and diarrhea     Time reflects when diagnosis was documented in both MDM as applicable and the Disposition within this note     Time User Action Codes Description Comment    5/12/2022  1:10 PM Annella Corn Add [R42] Lightheadedness     5/12/2022  1:11 PM Annella Corn Add [R10 9] Abdominal pain     5/12/2022  1:12 PM Isela Salcedo [R11 2,  R19 7] Nausea vomiting and diarrhea       ED Disposition     ED Disposition   Discharge    Condition   Stable    Date/Time   Thu May 12, 2022  1:10 PM    Comment   Neto Springer discharge to home/self care  Follow-up Information    None         Discharge Medication List as of 5/12/2022  1:12 PM      START taking these medications    Details   ondansetron (ZOFRAN) 4 mg tablet Take 1 tablet (4 mg total) by mouth every 6 (six) hours, Starting Thu 5/12/2022, Normal         CONTINUE these medications which have NOT CHANGED    Details   phenazopyridine (PYRIDIUM) 200 mg tablet Take 1 tablet (200 mg total) by mouth 3 (three) times a day, Starting Sun 4/24/2022, Normal             No discharge procedures on file      PDMP Review     None          ED Provider  Electronically Signed by           Radha Brewster DO  05/12/22 3056

## 2022-05-12 NOTE — Clinical Note
Ashlee Hebert was seen and treated in our emergency department on 5/12/2022  Diagnosis:     Suheidy    She may return on this date: 05/15/2022         If you have any questions or concerns, please don't hesitate to call        Rexie Olszewski, DO    ______________________________           _______________          _______________  Hospital Representative                              Date                                Time

## 2022-09-07 ENCOUNTER — TELEPHONE (OUTPATIENT)
Dept: GASTROENTEROLOGY | Facility: CLINIC | Age: 32
End: 2022-09-07

## 2022-09-07 NOTE — TELEPHONE ENCOUNTER
Patients GI provider:  Dr Adelaide Cruz    Number to return call: ([phone])    Reason for call:  office called to let you know patient's father  in [place] and she had to cancel her procedure with Dr Adelaide Cruz  They cancelled the procedure already  Thank you!     Scheduled procedure/appointment date if applicable: Apt/procedure was on for

## 2022-12-08 ENCOUNTER — PATIENT OUTREACH (OUTPATIENT)
Dept: OBGYN CLINIC | Facility: CLINIC | Age: 32
End: 2022-12-08

## 2022-12-08 ENCOUNTER — ANNUAL EXAM (OUTPATIENT)
Dept: OBGYN CLINIC | Facility: CLINIC | Age: 32
End: 2022-12-08

## 2022-12-08 ENCOUNTER — APPOINTMENT (OUTPATIENT)
Dept: LAB | Facility: CLINIC | Age: 32
End: 2022-12-08

## 2022-12-08 VITALS
HEIGHT: 59 IN | SYSTOLIC BLOOD PRESSURE: 110 MMHG | BODY MASS INDEX: 17.54 KG/M2 | DIASTOLIC BLOOD PRESSURE: 76 MMHG | WEIGHT: 87 LBS | HEART RATE: 102 BPM

## 2022-12-08 DIAGNOSIS — R79.9 ABNORMAL BLOOD SMEAR: ICD-10-CM

## 2022-12-08 DIAGNOSIS — Z01.419 ENCOUNTER FOR ANNUAL ROUTINE GYNECOLOGICAL EXAMINATION: Primary | ICD-10-CM

## 2022-12-08 DIAGNOSIS — Z11.3 SCREEN FOR STD (SEXUALLY TRANSMITTED DISEASE): ICD-10-CM

## 2022-12-08 DIAGNOSIS — Z13.31 POSITIVE DEPRESSION SCREENING: ICD-10-CM

## 2022-12-08 LAB
ALBUMIN SERPL BCP-MCNC: 3.8 G/DL (ref 3.5–5)
ALP SERPL-CCNC: 86 U/L (ref 46–116)
ALT SERPL W P-5'-P-CCNC: 36 U/L (ref 12–78)
ANION GAP SERPL CALCULATED.3IONS-SCNC: 6 MMOL/L (ref 4–13)
AST SERPL W P-5'-P-CCNC: 24 U/L (ref 5–45)
BASOPHILS # BLD AUTO: 0.04 THOUSANDS/ÂΜL (ref 0–0.1)
BASOPHILS NFR BLD AUTO: 0 % (ref 0–1)
BILIRUB SERPL-MCNC: 0.38 MG/DL (ref 0.2–1)
BUN SERPL-MCNC: 13 MG/DL (ref 5–25)
CALCIUM SERPL-MCNC: 9.5 MG/DL (ref 8.3–10.1)
CHLORIDE SERPL-SCNC: 106 MMOL/L (ref 96–108)
CO2 SERPL-SCNC: 23 MMOL/L (ref 21–32)
CREAT SERPL-MCNC: 0.54 MG/DL (ref 0.6–1.3)
EOSINOPHIL # BLD AUTO: 0.02 THOUSAND/ÂΜL (ref 0–0.61)
EOSINOPHIL NFR BLD AUTO: 0 % (ref 0–6)
ERYTHROCYTE [DISTWIDTH] IN BLOOD BY AUTOMATED COUNT: 13.1 % (ref 11.6–15.1)
GFR SERPL CREATININE-BSD FRML MDRD: 124 ML/MIN/1.73SQ M
GLUCOSE P FAST SERPL-MCNC: 80 MG/DL (ref 65–99)
HCT VFR BLD AUTO: 44.8 % (ref 34.8–46.1)
HGB BLD-MCNC: 13.8 G/DL (ref 11.5–15.4)
IMM GRANULOCYTES # BLD AUTO: 0.03 THOUSAND/UL (ref 0–0.2)
IMM GRANULOCYTES NFR BLD AUTO: 0 % (ref 0–2)
LYMPHOCYTES # BLD AUTO: 2.18 THOUSANDS/ÂΜL (ref 0.6–4.47)
LYMPHOCYTES NFR BLD AUTO: 24 % (ref 14–44)
MCH RBC QN AUTO: 29.4 PG (ref 26.8–34.3)
MCHC RBC AUTO-ENTMCNC: 30.8 G/DL (ref 31.4–37.4)
MCV RBC AUTO: 96 FL (ref 82–98)
MONOCYTES # BLD AUTO: 0.5 THOUSAND/ÂΜL (ref 0.17–1.22)
MONOCYTES NFR BLD AUTO: 5 % (ref 4–12)
NEUTROPHILS # BLD AUTO: 6.45 THOUSANDS/ÂΜL (ref 1.85–7.62)
NEUTS SEG NFR BLD AUTO: 71 % (ref 43–75)
NRBC BLD AUTO-RTO: 0 /100 WBCS
PLATELET # BLD AUTO: 348 THOUSANDS/UL (ref 149–390)
PMV BLD AUTO: 10.5 FL (ref 8.9–12.7)
POTASSIUM SERPL-SCNC: 4 MMOL/L (ref 3.5–5.3)
PROT SERPL-MCNC: 8.1 G/DL (ref 6.4–8.4)
RBC # BLD AUTO: 4.69 MILLION/UL (ref 3.81–5.12)
SODIUM SERPL-SCNC: 135 MMOL/L (ref 135–147)
WBC # BLD AUTO: 9.22 THOUSAND/UL (ref 4.31–10.16)

## 2022-12-08 NOTE — PROGRESS NOTES
Annual Exam    Assessment   1  Encounter for annual routine gynecological examination  Liquid-based pap, screening      2  Screen for STD (sexually transmitted disease)  Chlamydia/GC amplified DNA by PCR    HIV 1/2 Antigen/Antibody (4th Generation) w Reflex [facility]    RPR    Hepatitis C antibody    Hepatitis B surface antigen      3  Positive depression screening  Ambulatory Referral to Social Work Care Management Program        well woman       Plan       All questions answered  Breast self exam technique reviewed and patient encouraged to perform self-exam monthly  Chlamydia specimen  Contraception: none  Discussed healthy lifestyle modifications  Follow up in 1 year  GC specimen  Thin prep Pap smear  HIV, RPR, Hep b & Hep C     Patient Instructions   PAP and STD results can take up to 2 weeks  Continue contact with Brianne from social work as needed  Present to the emergency room with suicidal thoughts  Call with needs or concerns  Return in 1 year  Pt verbalized understanding of all discussed  Subjective      Marry Lewis is a 34 y o   female who presents for annual well woman exam  Periods are regular every 28-30 days, lasting 8 days  No intermenstrual bleeding, spotting, or discharge  2021 WNL PAP HPV other positive  1 partner x 1 1/2 years, denies domestic violence    Depression Screening Follow-up Plan: Patient's depression screening was positive with a PHQ-2 score of 6  Their PHQ-9 score was 19  Pt states she at times feels like she would be better off dead, she does not feel like that today, does not have a plan   Pt spoke to [facility] from social work today/      Current contraception: none, pt states she had a miscarriage 13 years ago and has not been able to become pregnanct since  History of abnormal Pap smear: no, HPV other positive   Family history of uterine or ovarian cancer: no  Regular self breast exam: yes  History of abnormal mammogram: N/A  Family history of breast cancer: no  History of abnormal lipids: unknown  Menstrual History:  OB History        1    Para        Term                AB   1    Living           SAB   1    IAB        Ectopic        Multiple        Live Births                    Menarche age: 14  Patient's last menstrual period was 11/15/2022  The following portions of the patient's history were reviewed and updated as appropriate: allergies, current medications, past family history, past medical history, past social history, past surgical history and problem list     Review of Systems  Pertinent items are noted in HPI        Objective      /76   Pulse 102   Ht 4' 11" (1 499 m)   Wt 39 5 kg (87 lb)   LMP 11/15/2022   BMI 17 57 kg/m²     General: alert and oriented, in no acute distress, alert, appears stated age and cooperative   Heart: regular rate and rhythm, S1, S2 normal, no murmur, click, rub or gallop   Lungs: clear to auscultation bilaterally, WNL respiratory effort, negative cough or SOB   Thyroid: Negative masses   Abdomen: soft, non-tender, without masses or organomegaly   Vulva: normal   Vagina: normal mucosa   Cervix: no cervical motion tenderness and no lesions   Uterus: normal size, non-tender, normal shape and consistency   Adnexa: normal adnexa   Urethra: normal   Breasts: NT,negative masses, discharge, or dimpling, bilateral nipple piercing

## 2022-12-08 NOTE — PROGRESS NOTES
VARUN FONG was referred by Vanessa Patel to talk with pt who is having a high depression screen in the office today and noted to have thoughts of self harm  VARUN AJIT talked to pt via phone using a [employer] interpretor  Pt reports that she has had occasional thoughts that she would be better off not here, she denies any intent or plan to harm herself today; experiencing complicated grief, her father passed away from lung cancer approx 3 months ago in [place], she was not able to see him at the end of his life but was there for his burial  Her mother is still in [place] and she reports family is staying with her as she navigates life without her   Pt reports that since her fathers cancer diagnosis she has experienced panic attacks and has been using a vape pen with nicotine to help with any anxiety symptoms but has no history of smoking prior  Pt is uninsured, she was denied for MA because of her income but can not afford commercial insurance  She is interested in starting medication for anxiety and acknowledges that using nicotine is an unhealthy coping mechanism  VARUN FONG sent a message to [facility] today asking them to [facility] her to schedule an appointment for anxiety, she is interested in starting medication  She would like therapy but understands there is a barrier to receiving services because she is uninsured  She is a resident of [place] and may qualify for funding that way  VARUN FONG emailed her additional resources today and will f/u in two weeks

## 2022-12-08 NOTE — LETTER
2022    Paul Quijano 45  [address]  [address]          2022    To Payam Springer  : 1990      This letter is to advise you that your recent CULTURES for gonorrhea and chlamydia were reviewed by me and are NORMAL  Please contact the office for an appointment if you have any additional concerns      KASSANDRA Turner

## 2022-12-08 NOTE — PATIENT INSTRUCTIONS
PAP and STD results can take up to 2 weeks  Continue contact with Brianne from social work as needed  Present to the emergency room with suicidal thoughts  Call with needs or concerns  Return in 1 year    COVID-19 Instructions    If you are having any of the following:  Cough   Shortness of breath   Fever  If traveled within past 2 weeks internationally or to high risk [place] states  Or been in contact with someone that has     Please call either:   Your PCP office  [phone], option [phone]    They will screen you over the phone and direct you to the nearest appropriate testing location    DO NOT go to your PCP or OB office without calling first       Sandy Li

## 2022-12-09 LAB
HBV SURFACE AG SER QL: NORMAL
HCV AB SER QL: NORMAL
HIV 1+2 AB+HIV1 P24 AG SERPL QL IA: NORMAL
RPR SER QL: NORMAL

## 2022-12-10 LAB
C TRACH DNA SPEC QL NAA+PROBE: NEGATIVE
HPV HR 12 DNA CVX QL NAA+PROBE: POSITIVE
HPV16 DNA CVX QL NAA+PROBE: NEGATIVE
HPV18 DNA CVX QL NAA+PROBE: NEGATIVE
N GONORRHOEA DNA SPEC QL NAA+PROBE: NEGATIVE

## 2022-12-14 ENCOUNTER — OFFICE VISIT (OUTPATIENT)
Dept: FAMILY MEDICINE CLINIC | Facility: CLINIC | Age: 32
End: 2022-12-14

## 2022-12-14 VITALS
TEMPERATURE: 97.8 F | HEIGHT: 59 IN | RESPIRATION RATE: 17 BRPM | HEART RATE: 84 BPM | SYSTOLIC BLOOD PRESSURE: 102 MMHG | DIASTOLIC BLOOD PRESSURE: 84 MMHG | OXYGEN SATURATION: 99 % | WEIGHT: 89 LBS | BODY MASS INDEX: 17.94 KG/M2

## 2022-12-14 DIAGNOSIS — F32.A ANXIETY AND DEPRESSION: Primary | ICD-10-CM

## 2022-12-14 DIAGNOSIS — F41.9 ANXIETY AND DEPRESSION: Primary | ICD-10-CM

## 2022-12-14 NOTE — PROGRESS NOTES
Name: Daniel Cain      : 1990      MRN: [id number]  Encounter Provider: Tracy Wang  Encounter Date: 2022   Encounter department: [facility]     1  Anxiety and depression  Assessment & Plan:  Patient did not want to complete the PHQ  Symptoms of depression and anxiety  Discussed treatment options  Patient would like to start a medication  Agreeable to start Zoloft 50 mg daily  Discussed treatment course and possible side effects  May take 4-6 weeks to reach maximum efficacy  Discussed BH  Patient declines at this time  Follow up in one month, sooner if needed  Orders:  -     sertraline (Zoloft) 50 mg tablet; Take 1 tablet (50 mg total) by mouth daily at bedtime         Subjective      Patient is a 34year old female with no PMH presenting to [facility] care  She would like to discuss her depression  States she has had depression in the past, but it has been more severe recently  She is down and depressed everyday  She feels hopeless and helpless  Sometimes she feels she would be better off if she was not here, in the world  Denies suicidal ideation  Denies HI  She has no motivation and does not want to do anything  Even things she used to like to do  Also admits to constant worry and anxiety  States she is often nervous and anxious, sometimes she can not control it  She has difficulty falling asleep at night because of this  She sometimes has chest tightness as well  Sometimes she shakes  She cries often and is emotional  States it worsened over the past couple months as her father passed away recently  Use of  phone was utilized during visit  Review of Systems   Constitutional: Negative for appetite change, chills, diaphoresis, fatigue, fever and unexpected weight change  Eyes: Negative for visual disturbance  Respiratory: Positive for chest tightness   Negative for cough, shortness of breath and wheezing  Cardiovascular: Negative for chest pain, palpitations and leg swelling  Gastrointestinal: Negative for abdominal pain, blood in stool, constipation, diarrhea, nausea and vomiting  Endocrine: Negative for cold intolerance, heat intolerance, polydipsia, polyphagia and polyuria  Musculoskeletal: Negative for arthralgias and myalgias  Skin: Negative for rash  Neurological: Positive for tremors  Negative for dizziness, weakness, light-headedness, numbness and headaches  Hematological: Negative for adenopathy  Psychiatric/Behavioral: Positive for dysphoric mood and sleep disturbance  Negative for self-injury and suicidal ideas  The patient is nervous/anxious  No current outpatient medications on file prior to visit  Objective     /84 (BP Location: Right arm, Patient Position: Sitting, Cuff Size: Standard)   Pulse 84   Temp 97 8 °F (36 6 °C) (Temporal)   Resp 17   Ht 4' 11" (1 499 m)   Wt 40 4 kg (89 lb)   LMP 11/15/2022   SpO2 99%   BMI 17 98 kg/m²     Physical Exam  Vitals and nursing note reviewed  Constitutional:       General: She is awake  She is not in acute distress  Appearance: Normal appearance  She is well-developed, well-groomed and normal weight  She is not ill-appearing  HENT:      Head: Normocephalic and atraumatic  Eyes:      General: No scleral icterus  Conjunctiva/sclera: Conjunctivae normal    Cardiovascular:      Rate and Rhythm: Normal rate and regular rhythm  Pulses: Normal pulses  Heart sounds: Normal heart sounds  No murmur heard  Pulmonary:      Effort: Pulmonary effort is normal  No respiratory distress  Breath sounds: Normal breath sounds and air entry  No decreased air movement  No decreased breath sounds, wheezing, rhonchi or rales  Musculoskeletal:         General: Normal range of motion  Cervical back: Neck supple  Right lower leg: No edema  Left lower leg: No edema     Lymphadenopathy: Cervical: No cervical adenopathy  Skin:     General: Skin is warm  Coloration: Skin is not jaundiced  Findings: No rash  Neurological:      General: No focal deficit present  Mental Status: She is alert and oriented to person, place, and time  Mental status is at baseline  Motor: Motor function is intact  Coordination: Coordination is intact  Gait: Gait is intact  Psychiatric:         Attention and Perception: Attention normal          Mood and Affect: Mood is depressed  Affect is tearful  Speech: Speech normal          Behavior: Behavior normal  Behavior is cooperative  Thought Content:  Thought content normal          Cognition and Memory: Cognition normal        Aron Ross PA-C

## 2022-12-16 PROBLEM — F32.A ANXIETY AND DEPRESSION: Status: ACTIVE | Noted: 2022-12-16

## 2022-12-16 PROBLEM — F41.9 ANXIETY AND DEPRESSION: Status: ACTIVE | Noted: 2022-12-16

## 2022-12-16 NOTE — ASSESSMENT & PLAN NOTE
Patient did not want to complete the PHQ  Symptoms of depression and anxiety  Discussed treatment options  Patient would like to start a medication  Agreeable to start Zoloft 50 mg daily  Discussed treatment course and possible side effects  May take 4-6 weeks to reach maximum efficacy  Discussed   Patient declines at this time  Follow up in one month, sooner if needed

## 2022-12-17 LAB
LAB AP GYN PRIMARY INTERPRETATION: NORMAL
Lab: NORMAL

## 2022-12-20 ENCOUNTER — TELEPHONE (OUTPATIENT)
Dept: OBGYN CLINIC | Facility: CLINIC | Age: 32
End: 2022-12-20

## 2022-12-20 NOTE — TELEPHONE ENCOUNTER
Called [facility] to explain WNL PAP with persistent HPV positive, D/W Dr Nathan Aden explained to pt need for colpo and procedure for colpo  Appointment was made today  Assistted by [facility] telephone  [phone]  Pt verbalized understanding of all discussed

## 2023-01-03 ENCOUNTER — PROCEDURE VISIT (OUTPATIENT)
Dept: OBGYN CLINIC | Facility: CLINIC | Age: 33
End: 2023-01-03

## 2023-01-03 VITALS
WEIGHT: 85 LBS | HEART RATE: 90 BPM | BODY MASS INDEX: 17.14 KG/M2 | DIASTOLIC BLOOD PRESSURE: 53 MMHG | HEIGHT: 59 IN | SYSTOLIC BLOOD PRESSURE: 122 MMHG

## 2023-01-03 DIAGNOSIS — R87.810 CERVICAL HIGH RISK HUMAN PAPILLOMAVIRUS (HPV) DNA TEST POSITIVE: Primary | ICD-10-CM

## 2023-01-03 LAB — SL AMB POCT URINE HCG: NORMAL

## 2023-01-03 NOTE — PROGRESS NOTES
Problem Visit     SUBJECTIVE:  CC: Colposcopy  HPI: Leah Mena is a 34 y o   female who presents for colposcopy  H/o abnormal pap x2;  22 --> NILM, other HR HPV Pos  21 --> NILM, other HR HPV Pos    She reports feeling well today; no h/o abnormal discharge, pain, or bleeding  Discussed the nature of HPV  Discussed that HPV is a sexually-transmitted infection, and there is a possibility that she could transmit it to a partner in the future  There is a risk of HPV-related penile cancer, however that risk is low  A future partner of his could then be exposed and have a risk of cervical pre-cancer or cancer  Discussed that the only way to limit the risk of transferring HPV is to not have sex or use condoms  Recommended everyone get the HPV vaccine series, as there may be protection for strains beyond those covered in the vaccine  Patient has completed her 3-dose series  12 o'clock, ECC  No abnormal cells       Colposcopy     Date/Time 1/3/2023 10:11 AM     Bly Protocol   Procedure performed by:  Consent: Verbal consent obtained  Written consent obtained  Consent given by: patient  Patient understanding: patient states understanding of the procedure being performed  Patient consent: the patient's understanding of the procedure matches consent given  Procedure consent: procedure consent matches procedure scheduled  Patient identity confirmed: verbally with patient       Performed by  Julian Lewis MD     Authorized by Julian Lewis MD        Pre-procedure details     Prepped with: acetic acid and Lugol       Indication    Indications: Persistent HPV       Procedure Details   Procedure: Colposcopy w/ cervical biopsy and ECC      Under satisfactory analgesia the patient was prepped and draped in the dorsal lithotomy position: yes      Petroleum speculum was placed in the vagina: yes      Under colposcopic examination the transition zone was seen in entirety: yes Endocervix was curetted using a Geovannyorkian curette: yes      Biopsy(s): yes      Location:  12 o'clock    Specimen to pathology: yes       Post-procedure      Impression comment:  No obvious cervical dysplasia visualized    Patient tolerance of procedure: Tolerated well, no immediate complications     Comments       Vinegar and Lugol's applied; no abnormal areas seen  Silver nitrate used to establish hemostasis of biopsy site  No past medical history on file  Past Surgical History:   Procedure Laterality Date   • BLADDER REPAIR     • KIDNEY STONE SURGERY     • OVARIAN CYST REMOVAL Right        Social History     Tobacco Use   • Smoking status: Some Days   • Smokeless tobacco: Never   • Tobacco comments:     hookah   Substance Use Topics   • Alcohol use: Not Currently   • Drug use: Not Currently     Types: Marijuana     Comment: social         Current Outpatient Medications:   •  sertraline (Zoloft) 50 mg tablet, Take 1 tablet (50 mg total) by mouth daily at bedtime, Disp: 30 tablet, Rfl: 2      OBJECTIVE:  Vitals:    01/03/23 0820   BP: 122/53   Pulse: 90   Weight: 38 6 kg (85 lb)   Height: 4' 11" (1 499 m)       Physical Exam  Constitutional:       General: She is not in acute distress  Appearance: Normal appearance  Pulmonary:      Effort: Pulmonary effort is normal    Skin:     Coloration: Skin is not pale  Findings: No rash  Neurological:      Mental Status: She is alert  : Normal appearing external genitalia, vaginal mucosa, and cervix  Normal physiologic discharge present  No evidence of vaginal, cervical, or uterine bleeding      ASSESSMENT/PLAN:  Problem List        Other    Anxiety and depression    Cervical high risk human papillomavirus (HPV) DNA test positive    Overview     12/2/2021 PAP WNL HPV High risk positive, needs repeat PAP with HPV co-testing December of 2022 12/18/22 WNL PAP HPV other positive needs colpo         Family history of colon cancer    Low isaak Palomino is a 34 y o   female who presents for colposcopy; h/o NILM, other HR HPV Pos x2 over 2 years  RTC for annual, and as needed        Adri Kaufman MD   PGY-3, OBGYN  23 8:10 AM

## 2023-01-10 ENCOUNTER — TELEPHONE (OUTPATIENT)
Dept: OBGYN CLINIC | Facility: CLINIC | Age: 33
End: 2023-01-10

## 2023-01-10 NOTE — TELEPHONE ENCOUNTER
----- Message from Warden Alexander MD sent at 1/8/2023  2:56 PM EST -----  Please call the patient to let her know her colposcopy showed no abnormal cells  We do not need to do anything else for this besides repeating her pap smear in 1 year  Thanks!

## 2023-01-18 ENCOUNTER — OFFICE VISIT (OUTPATIENT)
Dept: FAMILY MEDICINE CLINIC | Facility: CLINIC | Age: 33
End: 2023-01-18

## 2023-01-18 VITALS
HEIGHT: 59 IN | RESPIRATION RATE: 18 BRPM | BODY MASS INDEX: 17.94 KG/M2 | SYSTOLIC BLOOD PRESSURE: 112 MMHG | OXYGEN SATURATION: 98 % | HEART RATE: 82 BPM | TEMPERATURE: 98 F | WEIGHT: 89 LBS | DIASTOLIC BLOOD PRESSURE: 58 MMHG

## 2023-01-18 DIAGNOSIS — F32.A ANXIETY AND DEPRESSION: Primary | ICD-10-CM

## 2023-01-18 DIAGNOSIS — R63.6 LOW WEIGHT: ICD-10-CM

## 2023-01-18 DIAGNOSIS — F41.9 ANXIETY AND DEPRESSION: Primary | ICD-10-CM

## 2023-01-18 RX ORDER — HYDROXYZINE HYDROCHLORIDE 25 MG/1
25 TABLET, FILM COATED ORAL 3 TIMES DAILY PRN
Qty: 30 TABLET | Refills: 2 | Status: SHIPPED | OUTPATIENT
Start: 2023-01-18

## 2023-01-18 RX ORDER — SERTRALINE HYDROCHLORIDE 100 MG/1
100 TABLET, FILM COATED ORAL
Qty: 30 TABLET | Refills: 2 | Status: SHIPPED | OUTPATIENT
Start: 2023-01-18

## 2023-01-18 NOTE — ASSESSMENT & PLAN NOTE
Small improvement with Zoloft  Discussed treatment options  Patient was agreeable to increase from 50 to 100 mg daily  Start Atarax as needed  Discussed treatment course an possible side effects  May take 4-6 weeks to reach maximum efficacy at new dosing  Discussed BH  Patient declines at this time  Follow up in 1-2 months, sooner if needed

## 2023-01-18 NOTE — PROGRESS NOTES
Name: Luisa Valadez      : 1990      MRN: [id number]  Encounter Provider: Francisco Ba  Encounter Date: 2023   Encounter department: [facility]     1  Anxiety and depression  Assessment & Plan:  Small improvement with Zoloft  Discussed treatment options  Patient was agreeable to increase from 50 to 100 mg daily  Start Atarax as needed  Discussed treatment course an possible side effects  May take 4-6 weeks to reach maximum efficacy at new dosing  Discussed BH  Patient declines at this time  Follow up in 1-2 months, sooner if needed  Orders:  -     sertraline (Zoloft) 100 mg tablet; Take 1 tablet (100 mg total) by mouth daily at bedtime  -     hydrOXYzine HCL (ATARAX) 25 mg tablet; Take 1 tablet (25 mg total) by mouth 3 (three) times a day as needed for anxiety    2  Low weight  Assessment & Plan:  Previous work up normal  Patient's baseline  See BMI counseling for recommendations  BMI Counseling: Body mass index is 17 98 kg/m²  The BMI is below normal  Patient advised to gain weight and dietary education for weight gain was provided  Rationale for BMI follow-up plan is due to patient being underweight  Tobacco Cessation Counseling: Tobacco cessation counseling was provided  The patient is sincerely urged to quit consumption of tobacco  She is not ready to quit tobacco  Medication options and side effects of medication discussed  Patient refused medication  Subjective      Patient is a 34year old female with no PMH presenting to follow up on anxiety  She has seen a small improvement since she started Zoloft last month  She is down and depressed everyday  She feels hopeless and helpless  Sometimes she feels she would be better off if she was not here, in the world  Denies suicidal ideation  Denies HI  She has no motivation and does not want to do anything  Even things she used to like to do   Also admits to constant worry and anxiety  States she is often nervous and anxious, sometimes she can not control it  She has difficulty falling asleep at night because of this  She sometimes has chest tightness as well  Sometimes she shakes  She cries often and is emotional  States it worsened over the past couple months as her father passed away recently  Use of  phone was utilized during visit  Review of Systems   Constitutional: Negative for appetite change, chills, diaphoresis, fatigue, fever and unexpected weight change  Eyes: Negative for visual disturbance  Respiratory: Positive for chest tightness  Negative for cough, shortness of breath and wheezing  Cardiovascular: Negative for chest pain, palpitations and leg swelling  Gastrointestinal: Negative for abdominal pain, blood in stool, constipation, diarrhea, nausea and vomiting  Endocrine: Negative for cold intolerance, heat intolerance, polydipsia, polyphagia and polyuria  Musculoskeletal: Negative for arthralgias and myalgias  Skin: Negative for rash  Neurological: Positive for tremors  Negative for dizziness, weakness, light-headedness, numbness and headaches  Hematological: Negative for adenopathy  Psychiatric/Behavioral: Positive for dysphoric mood and sleep disturbance  Negative for self-injury and suicidal ideas  The patient is nervous/anxious  Current Outpatient Medications on File Prior to Visit   Medication Sig   • [DISCONTINUED] sertraline (Zoloft) 50 mg tablet Take 1 tablet (50 mg total) by mouth daily at bedtime       Objective     /58 (BP Location: Right arm, Patient Position: Sitting, Cuff Size: Standard)   Pulse 82   Temp 98 °F (36 7 °C) (Temporal)   Resp 18   Ht 4' 11" (1 499 m)   Wt 40 4 kg (89 lb)   LMP 12/19/2022 (Exact Date)   SpO2 98%   BMI 17 98 kg/m²     Physical Exam  Vitals and nursing note reviewed  Constitutional:       General: She is awake  She is not in acute distress  Appearance: Normal appearance  She is well-developed, well-groomed and normal weight  She is not ill-appearing  HENT:      Head: Normocephalic and atraumatic  Eyes:      General: No scleral icterus  Conjunctiva/sclera: Conjunctivae normal    Cardiovascular:      Rate and Rhythm: Normal rate and regular rhythm  Pulses: Normal pulses  Heart sounds: Normal heart sounds  No murmur heard  Pulmonary:      Effort: Pulmonary effort is normal  No respiratory distress  Breath sounds: Normal breath sounds and air entry  No decreased air movement  No decreased breath sounds, wheezing, rhonchi or rales  Musculoskeletal:         General: Normal range of motion  Cervical back: Neck supple  Right lower leg: No edema  Left lower leg: No edema  Lymphadenopathy:      Cervical: No cervical adenopathy  Skin:     General: Skin is warm  Coloration: Skin is not jaundiced  Findings: No rash  Neurological:      General: No focal deficit present  Mental Status: She is alert and oriented to person, place, and time  Mental status is at baseline  Motor: Motor function is intact  Coordination: Coordination is intact  Gait: Gait is intact  Psychiatric:         Attention and Perception: Attention normal          Mood and Affect: Mood is depressed  Affect is tearful  Speech: Speech normal          Behavior: Behavior normal  Behavior is cooperative  Thought Content:  Thought content normal          Cognition and Memory: Cognition normal        Mirlande Barry PA-C

## 2023-03-22 ENCOUNTER — OFFICE VISIT (OUTPATIENT)
Dept: FAMILY MEDICINE CLINIC | Facility: CLINIC | Age: 33
End: 2023-03-22

## 2023-03-22 VITALS
HEART RATE: 90 BPM | RESPIRATION RATE: 18 BRPM | WEIGHT: 88.1 LBS | TEMPERATURE: 97.8 F | BODY MASS INDEX: 17.76 KG/M2 | DIASTOLIC BLOOD PRESSURE: 70 MMHG | HEIGHT: 59 IN | SYSTOLIC BLOOD PRESSURE: 102 MMHG | OXYGEN SATURATION: 99 %

## 2023-03-22 DIAGNOSIS — F41.9 ANXIETY AND DEPRESSION: Primary | ICD-10-CM

## 2023-03-22 DIAGNOSIS — F32.A ANXIETY AND DEPRESSION: Primary | ICD-10-CM

## 2023-03-22 RX ORDER — SERTRALINE HYDROCHLORIDE 100 MG/1
150 TABLET, FILM COATED ORAL DAILY
Qty: 180 TABLET | Refills: 0 | Status: SHIPPED | OUTPATIENT
Start: 2023-03-22

## 2023-03-22 NOTE — ASSESSMENT & PLAN NOTE
- Moderate improvement with Zoloft 100 mg daily  Will increase to zoloft 150 mg daily  Continue with atarax prn  - Reviewed lifestyle management such as exercise, meditation, drinking 64 oz of water daily, and following a healthy diet  - Agreeable to CBT   Provided patient with list of local mental health resources

## 2023-03-22 NOTE — PROGRESS NOTES
Name: Enrrique Limon      : 1990      MRN: [id number]  Encounter Provider: Daryl Bumpers, CRNP  Encounter Date: 3/22/2023   Encounter department: [address] [place] [address]  Anxiety and depression  Assessment & Plan:  - Moderate improvement with Zoloft 100 mg daily  Will increase to zoloft 150 mg daily  Continue with atarax prn  - Reviewed lifestyle management such as exercise, meditation, drinking 64 oz of water daily, and following a healthy diet  - Agreeable to CBT  Provided patient with list of local mental health resources    Orders:  -     sertraline (ZOLOFT) 100 mg tablet; Take 1 5 tablets (150 mg total) by mouth daily  -     Ambulatory Referral to Social Work Care Management Program; Future         Subjective      Enrrique Limon is a 28 y o  female  has no past medical history on file  has a past surgical history that includes Bladder repair; Kidney stone surgery; and Ovarian cyst removal (Right)  Subjective  Enrrique Limon is a 28 y o  female who presents for follow up of anxiety & depression disorder  Current symptoms: difficulty concentrating, feelings of losing control, racing thoughts  She denies current suicidal and homicidal ideation  She complains of the following side effects from the treatment: none  She is currently taking zoloft 100 mg daily & atarax prn  She is using atarax about once a week  She reports moderate improvement with zoloft 100 mg daily  She does practice deep breathing exercises, does not report participation in physical exercise  Review of Systems   Constitutional: Negative for chills and fever  HENT: Negative for ear pain and sore throat  Eyes: Negative for pain and visual disturbance  Respiratory: Negative for cough and shortness of breath  Cardiovascular: Negative for chest pain and palpitations  Gastrointestinal: Negative for abdominal pain and vomiting     Genitourinary: Negative for dysuria and hematuria  Musculoskeletal: Negative for arthralgias and back pain  Skin: Negative for color change and rash  Neurological: Negative for seizures and syncope  Psychiatric/Behavioral: The patient is nervous/anxious  All other systems reviewed and are negative  Current Outpatient Medications on File Prior to Visit   Medication Sig   • hydrOXYzine HCL (ATARAX) 25 mg tablet Take 1 tablet (25 mg total) by mouth 3 (three) times a day as needed for anxiety   • [DISCONTINUED] sertraline (Zoloft) 100 mg tablet Take 1 tablet (100 mg total) by mouth daily at bedtime       Objective     /70 (BP Location: Right arm, Patient Position: Sitting, Cuff Size: Standard)   Pulse 90   Temp 97 8 °F (36 6 °C) (Temporal)   Resp 18   Ht 4' 11" (1 499 m)   Wt 40 kg (88 lb 1 6 oz)   LMP 02/20/2023 (Exact Date)   SpO2 99%   BMI 17 79 kg/m²     Physical Exam  Vitals and nursing note reviewed  HENT:      Head: Normocephalic and atraumatic  Right Ear: External ear normal       Left Ear: External ear normal       Nose: Nose normal    Eyes:      Extraocular Movements: Extraocular movements intact  Conjunctiva/sclera: Conjunctivae normal       Pupils: Pupils are equal, round, and reactive to light  Cardiovascular:      Rate and Rhythm: Normal rate and regular rhythm  Pulses: Normal pulses  Heart sounds: Normal heart sounds  Pulmonary:      Effort: Pulmonary effort is normal       Breath sounds: Normal breath sounds  Abdominal:      Palpations: Abdomen is soft  Tenderness: There is no abdominal tenderness  Musculoskeletal:         General: Normal range of motion  Cervical back: Normal range of motion  Skin:     General: Skin is warm and dry  Neurological:      General: No focal deficit present  Mental Status: She is alert and oriented to person, place, and time  Mental status is at baseline     Psychiatric:         Attention and Perception: Attention and perception normal          Mood and Affect: Mood and affect normal          Speech: Speech normal          Behavior: Behavior normal  Behavior is cooperative  Thought Content: Thought content normal  Thought content is not paranoid or delusional  Thought content does not include homicidal or suicidal ideation  Thought content does not include homicidal or suicidal plan           Cognition and Memory: Cognition and memory normal          Judgment: Judgment normal        Suella Clock

## 2023-03-23 ENCOUNTER — PATIENT OUTREACH (OUTPATIENT)
Dept: OBGYN CLINIC | Facility: CLINIC | Age: 33
End: 2023-03-23

## 2023-03-24 NOTE — PROGRESS NOTES
Reviewed patients chart at latest PCP visit, PCP recommending she start mental health services in the community  VARUN FONG attempted to call pt about those recommendations and resources, no answer, left VM explaining I would email three agencies that accept her insurance  Pt now insured under [address]  Resources emailed today       VARUN FONG will call pt in one week

## 2023-03-31 ENCOUNTER — PATIENT OUTREACH (OUTPATIENT)
Dept: OBGYN CLINIC | Facility: CLINIC | Age: 33
End: 2023-03-31

## 2023-03-31 NOTE — PROGRESS NOTES
Called with the interpretor, 2nd attempt to outreach, checked in to see if she got my email with a list of [facility] providers last week, left another VM

## 2023-04-25 ENCOUNTER — PATIENT OUTREACH (OUTPATIENT)
Dept: OBGYN CLINIC | Facility: CLINIC | Age: 33
End: 2023-04-25

## 2023-04-28 ENCOUNTER — PATIENT OUTREACH (OUTPATIENT)
Dept: OBGYN CLINIC | Facility: CLINIC | Age: 33
End: 2023-04-28

## 2023-04-28 NOTE — PROGRESS NOTES
VARUN FONG called the pt to see if she has heard back from [facility], she reports that she has not heard anything from them, she is agreeable to VARUN FONG sending her their number via text for her to call when she has the ability and I will f/u with her via text next week to see if she was able to get scheduled

## 2023-05-05 ENCOUNTER — PATIENT OUTREACH (OUTPATIENT)
Dept: OBGYN CLINIC | Facility: CLINIC | Age: 33
End: 2023-05-05

## 2023-05-05 NOTE — PROGRESS NOTES
VARUN FONG outreached the pt today via text, she reports that she did call [facility], however, they hung up on her, VARUN FONG apologized for her experience, VARUN FONG sent her [address] to call, she thanked VARUN FONG and will let me know if she is able to get scheduled

## 2023-05-12 ENCOUNTER — PATIENT OUTREACH (OUTPATIENT)
Dept: OBGYN CLINIC | Facility: CLINIC | Age: 33
End: 2023-05-12

## 2023-05-12 NOTE — PROGRESS NOTES
VARUN FONG sent a text to Neto to check in and see if she was able to call any of the [facility] agencies provided, she reports she has not been able to call to follow up, VARUN FONG enc her to call when able  VARUN FONG will close this referral at this time, pt has resources available for therapy

## 2023-05-30 ENCOUNTER — TELEPHONE (OUTPATIENT)
Dept: FAMILY MEDICINE CLINIC | Facility: CLINIC | Age: 33
End: 2023-05-30

## 2023-06-07 NOTE — PROGRESS NOTES
Name: Sharlene Gottlieb      : 1990      MRN: [id number]  Encounter Provider: KASSANDRA Mitchell  Encounter Date: 2023   Encounter department: [address] [place] [address]     1  Chest pain, unspecified type  Assessment & Plan:  In office EKG showed NSR  Likely 2/2 anxiety, check labs    Orders:  -     POCT ECG    2  Dizziness  Assessment & Plan:  - Check labs    Orders:  -     Comprehensive metabolic panel; Future  -     CBC and differential; Future  -     Vitamin D 25 hydroxy; Future  -     TSH, 3rd generation with Free T4 reflex; Future    3  Anxiety and depression  Assessment & Plan:  - Continue zoloft 150 mg daily & weekly sessions with therapists   - Recommend lifestyle management such as exercise, meditation, drinking 64 oz of water daily, and following a healthy diet  4  Generalized abdominal pain  Assessment & Plan:  - Pt to keep a food diary & bring to OV in 2 weeks  Subjective      Sharlene Gottlieb is a 28 y o  female  has no past medical history on file  has a past surgical history that includes Bladder repair; Kidney stone surgery; and Ovarian cyst removal (Right)  Anxiety  Patient is here for evaluation of anxiety  She has the following anxiety symptoms: chest pain, dizziness  Onset of symptoms was approximately several months ago  Symptoms have been gradually worsening since that time  She denies current suicidal and homicidal ideation  Possible organic causes contributing are: endocrine/metabolic, nutritional   Previous treatment includes medication Zoloft  She complains of the following medication side effects: none  She went to [facility] and spoke with a therapist which she reports helped  She plans to continue weekly therapy sessions  Zoloft has helped but not completely  At this time, she would prefer to attend more therapy sessions before increasing medication dose       She reports that the dizziness has been "a long-standing issue and she was previously advised that it was caused by her abnormal hgb level but latest hgbs have been normal  The dizziness does occur at times when she is anxious but it also occurs when she is not anxious  The dizziness is not exacerbated by anything, even movement  She drinks over 64 oz of water per day and does not have any food insecurities  She eats 3 well sized meals a day  She is underweight but she reports that she has been small all her life  She also endorses abdominal pain with nausea after eating certain foods  She cannot specify exactly with what types of food  Review of Systems   Constitutional: Negative for chills and fever  HENT: Negative for ear pain and sore throat  Eyes: Negative for pain and visual disturbance  Respiratory: Negative for cough and shortness of breath  Cardiovascular: Positive for chest pain  Negative for palpitations  Gastrointestinal: Positive for abdominal pain and nausea  Negative for vomiting  Genitourinary: Negative for dysuria and hematuria  Musculoskeletal: Negative for arthralgias and back pain  Skin: Negative for color change and rash  Neurological: Positive for dizziness  Negative for seizures and syncope  All other systems reviewed and are negative  Current Outpatient Medications on File Prior to Visit   Medication Sig   • hydrOXYzine HCL (ATARAX) 25 mg tablet Take 1 tablet (25 mg total) by mouth 3 (three) times a day as needed for anxiety   • sertraline (ZOLOFT) 100 mg tablet Take 1 5 tablets (150 mg total) by mouth daily       Objective     /60 (BP Location: Right arm, Patient Position: Sitting, Cuff Size: Standard)   Pulse 80   Temp 98 4 °F (36 9 °C) (Temporal)   Resp 16   Ht 4' 11\" (1 499 m)   Wt 39 5 kg (87 lb)   LMP 05/25/2023   SpO2 97%   Breastfeeding No   BMI 17 57 kg/m²     Physical Exam  Vitals and nursing note reviewed  HENT:      Head: Normocephalic and atraumatic        Right Ear: " Tympanic membrane, ear canal and external ear normal       Left Ear: Tympanic membrane, ear canal and external ear normal       Nose: Nose normal    Eyes:      Extraocular Movements: Extraocular movements intact  Conjunctiva/sclera: Conjunctivae normal       Pupils: Pupils are equal, round, and reactive to light  Cardiovascular:      Rate and Rhythm: Normal rate and regular rhythm  Pulses: Normal pulses  Heart sounds: Normal heart sounds  Pulmonary:      Effort: Pulmonary effort is normal       Breath sounds: Normal breath sounds  Abdominal:      General: Bowel sounds are normal       Palpations: Abdomen is soft  Tenderness: There is no abdominal tenderness  Musculoskeletal:         General: Normal range of motion  Cervical back: Normal range of motion  Skin:     General: Skin is warm and dry  Neurological:      General: No focal deficit present  Mental Status: She is alert and oriented to person, place, and time  Mental status is at baseline  Cranial Nerves: Cranial nerves 2-12 are intact  Sensory: Sensation is intact  Motor: Motor function is intact  Coordination: Coordination is intact  Gait: Gait is intact  Comments: (-) audi-hallpike   Psychiatric:         Mood and Affect: Mood normal          Behavior: Behavior normal          Thought Content:  Thought content normal          Judgment: Judgment normal        Eileen Dyers

## 2023-06-08 ENCOUNTER — OFFICE VISIT (OUTPATIENT)
Dept: FAMILY MEDICINE CLINIC | Facility: CLINIC | Age: 33
End: 2023-06-08

## 2023-06-08 ENCOUNTER — APPOINTMENT (OUTPATIENT)
Dept: LAB | Facility: CLINIC | Age: 33
End: 2023-06-08
Payer: COMMERCIAL

## 2023-06-08 VITALS
TEMPERATURE: 98.4 F | WEIGHT: 87 LBS | DIASTOLIC BLOOD PRESSURE: 60 MMHG | HEART RATE: 80 BPM | BODY MASS INDEX: 17.54 KG/M2 | SYSTOLIC BLOOD PRESSURE: 104 MMHG | OXYGEN SATURATION: 97 % | RESPIRATION RATE: 16 BRPM | HEIGHT: 59 IN

## 2023-06-08 DIAGNOSIS — F32.A ANXIETY AND DEPRESSION: ICD-10-CM

## 2023-06-08 DIAGNOSIS — R10.84 GENERALIZED ABDOMINAL PAIN: ICD-10-CM

## 2023-06-08 DIAGNOSIS — R42 DIZZINESS: ICD-10-CM

## 2023-06-08 DIAGNOSIS — R07.9 CHEST PAIN, UNSPECIFIED TYPE: Primary | ICD-10-CM

## 2023-06-08 DIAGNOSIS — F41.9 ANXIETY AND DEPRESSION: ICD-10-CM

## 2023-06-08 LAB
25(OH)D3 SERPL-MCNC: 24 NG/ML (ref 30–100)
ALBUMIN SERPL BCP-MCNC: 3.5 G/DL (ref 3.5–5)
ALP SERPL-CCNC: 97 U/L (ref 46–116)
ALT SERPL W P-5'-P-CCNC: 42 U/L (ref 12–78)
ANION GAP SERPL CALCULATED.3IONS-SCNC: 2 MMOL/L (ref 4–13)
AST SERPL W P-5'-P-CCNC: 31 U/L (ref 5–45)
BASOPHILS # BLD AUTO: 0.03 THOUSANDS/ÂΜL (ref 0–0.1)
BASOPHILS NFR BLD AUTO: 0 % (ref 0–1)
BILIRUB SERPL-MCNC: 0.33 MG/DL (ref 0.2–1)
BUN SERPL-MCNC: 13 MG/DL (ref 5–25)
CALCIUM SERPL-MCNC: 9.1 MG/DL (ref 8.3–10.1)
CHLORIDE SERPL-SCNC: 112 MMOL/L (ref 96–108)
CO2 SERPL-SCNC: 25 MMOL/L (ref 21–32)
CREAT SERPL-MCNC: 0.51 MG/DL (ref 0.6–1.3)
EOSINOPHIL # BLD AUTO: 0.01 THOUSAND/ÂΜL (ref 0–0.61)
EOSINOPHIL NFR BLD AUTO: 0 % (ref 0–6)
ERYTHROCYTE [DISTWIDTH] IN BLOOD BY AUTOMATED COUNT: 12.9 % (ref 11.6–15.1)
GFR SERPL CREATININE-BSD FRML MDRD: 125 ML/MIN/1.73SQ M
GLUCOSE P FAST SERPL-MCNC: 79 MG/DL (ref 65–99)
HCT VFR BLD AUTO: 41.4 % (ref 34.8–46.1)
HGB BLD-MCNC: 13.1 G/DL (ref 11.5–15.4)
IMM GRANULOCYTES # BLD AUTO: 0.03 THOUSAND/UL (ref 0–0.2)
IMM GRANULOCYTES NFR BLD AUTO: 0 % (ref 0–2)
LYMPHOCYTES # BLD AUTO: 2.13 THOUSANDS/ÂΜL (ref 0.6–4.47)
LYMPHOCYTES NFR BLD AUTO: 26 % (ref 14–44)
MCH RBC QN AUTO: 29.8 PG (ref 26.8–34.3)
MCHC RBC AUTO-ENTMCNC: 31.6 G/DL (ref 31.4–37.4)
MCV RBC AUTO: 94 FL (ref 82–98)
MONOCYTES # BLD AUTO: 0.39 THOUSAND/ÂΜL (ref 0.17–1.22)
MONOCYTES NFR BLD AUTO: 5 % (ref 4–12)
NEUTROPHILS # BLD AUTO: 5.68 THOUSANDS/ÂΜL (ref 1.85–7.62)
NEUTS SEG NFR BLD AUTO: 69 % (ref 43–75)
NRBC BLD AUTO-RTO: 0 /100 WBCS
PLATELET # BLD AUTO: 351 THOUSANDS/UL (ref 149–390)
PMV BLD AUTO: 10.3 FL (ref 8.9–12.7)
POTASSIUM SERPL-SCNC: 4 MMOL/L (ref 3.5–5.3)
PROT SERPL-MCNC: 7.4 G/DL (ref 6.4–8.4)
RBC # BLD AUTO: 4.4 MILLION/UL (ref 3.81–5.12)
SODIUM SERPL-SCNC: 139 MMOL/L (ref 135–147)
TSH SERPL DL<=0.05 MIU/L-ACNC: 1.31 UIU/ML (ref 0.45–4.5)
WBC # BLD AUTO: 8.27 THOUSAND/UL (ref 4.31–10.16)

## 2023-06-08 PROCEDURE — 84443 ASSAY THYROID STIM HORMONE: CPT

## 2023-06-08 PROCEDURE — 93000 ELECTROCARDIOGRAM COMPLETE: CPT

## 2023-06-08 PROCEDURE — 85025 COMPLETE CBC W/AUTO DIFF WBC: CPT

## 2023-06-08 PROCEDURE — 80053 COMPREHEN METABOLIC PANEL: CPT

## 2023-06-08 PROCEDURE — 82306 VITAMIN D 25 HYDROXY: CPT

## 2023-06-08 PROCEDURE — 99214 OFFICE O/P EST MOD 30 MIN: CPT

## 2023-06-08 PROCEDURE — 36415 COLL VENOUS BLD VENIPUNCTURE: CPT

## 2023-06-08 NOTE — ASSESSMENT & PLAN NOTE
- Continue zoloft 150 mg daily & weekly sessions with therapists   - Recommend lifestyle management such as exercise, meditation, drinking 64 oz of water daily, and following a healthy diet

## 2023-06-12 DIAGNOSIS — E55.9 VITAMIN D DEFICIENCY: Primary | ICD-10-CM

## 2023-06-12 RX ORDER — ERGOCALCIFEROL 1.25 MG/1
50000 CAPSULE ORAL WEEKLY
Qty: 12 CAPSULE | Refills: 0 | Status: SHIPPED | OUTPATIENT
Start: 2023-06-12

## 2023-07-07 ENCOUNTER — HOSPITAL ENCOUNTER (EMERGENCY)
Facility: HOSPITAL | Age: 33
Discharge: HOME | End: 2023-07-07
Attending: EMERGENCY MEDICINE
Payer: COMMERCIAL

## 2023-07-07 VITALS
SYSTOLIC BLOOD PRESSURE: 136 MMHG | DIASTOLIC BLOOD PRESSURE: 73 MMHG | OXYGEN SATURATION: 100 % | WEIGHT: 87.52 LBS | TEMPERATURE: 98.6 F | HEART RATE: 72 BPM | BODY MASS INDEX: 17.68 KG/M2 | RESPIRATION RATE: 16 BRPM

## 2023-07-07 DIAGNOSIS — S39.011A STRAIN OF MUSCLE OF RIGHT GROIN REGION: Primary | ICD-10-CM

## 2023-07-07 PROCEDURE — 99282 EMERGENCY DEPT VISIT SF MDM: CPT

## 2023-07-07 RX ORDER — NAPROXEN 500 MG/1
500 TABLET ORAL 2 TIMES DAILY WITH MEALS
Qty: 10 TABLET | Refills: 0 | Status: SHIPPED | OUTPATIENT
Start: 2023-07-07 | End: 2024-07-06

## 2023-07-07 RX ORDER — IBUPROFEN 600 MG/1
600 TABLET ORAL ONCE
Status: COMPLETED | OUTPATIENT
Start: 2023-07-07 | End: 2023-07-07

## 2023-07-07 RX ADMIN — IBUPROFEN 600 MG: 600 TABLET, FILM COATED ORAL at 12:38

## 2023-07-07 NOTE — DISCHARGE INSTRUCTIONS
Follow up with PCP. Follow up with Ortho as needed. Return to ED for new or worsening symptoms as discussed.

## 2023-07-07 NOTE — ED PROVIDER NOTES
History  Chief Complaint   Patient presents with   • Leg Pain     Pt reports via  that she is having pain in her right thigh that hurts when she walks. Reports it has been going on for 1 week. No meds PTA. Denies injury, or strenuous exercise.     28 y.o. F with PMH of nephrolithiasis, ovarian cysts presents to ED c/o R sided groin pain x 1 week. Pain with ambulation, starts immediately. Also worse with bending, palpation, hyperextension of hip. Denies injury/trauma or heavy lifting. 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 States that this is happened before and it went away on its own within a few days. Pain improves with rest. Denies swelling, redness, discoloration. History provided by:  Patient   used: Yes    Leg Pain  Location:  Leg  Prior injury to area:  Yes  Ineffective treatments:  None tried  Associated symptoms: no back pain, no decreased ROM, no fatigue, no fever, no itching, no muscle weakness, no neck pain, no numbness, no stiffness, no swelling and no tingling        Prior to Admission Medications   Prescriptions Last Dose Informant Patient Reported? Taking?   ergocalciferol (VITAMIN D2) 50,000 units   No No   Sig: Take 1 capsule (50,000 Units total) by mouth once a week   hydrOXYzine HCL (ATARAX) 25 mg tablet   No No   Sig: Take 1 tablet (25 mg total) by mouth 3 (three) times a day as needed for anxiety   sertraline (ZOLOFT) 100 mg tablet   No No   Sig: Take 1.5 tablets (150 mg total) by mouth daily      Facility-Administered Medications: None       History reviewed. No pertinent past medical history. Past Surgical History:   Procedure Laterality Date   • BLADDER REPAIR     • KIDNEY STONE SURGERY     • OVARIAN CYST REMOVAL Right        Family History   Problem Relation Age of Onset   • Diabetes Father      I have reviewed and agree with the history as documented.     E-Cigarette/Vaping     E-Cigarette/Vaping Substances   • Nicotine No    • THC No    • CBD No    • Flavoring No    • Other No    • Unknown No      Social History     Tobacco Use   • Smoking status: Some Days   • Smokeless tobacco: Never   • Tobacco comments:     hookah   Substance Use Topics   • Alcohol use: Not Currently   • Drug use: Not Currently     Types: Marijuana     Comment: social       Review of Systems   Constitutional: Negative for chills, fatigue and fever. Respiratory: Negative for shortness of breath. Cardiovascular: Negative for chest pain, palpitations and leg swelling. Genitourinary: Negative for decreased urine volume, difficulty urinating, dysuria, frequency, hematuria, pelvic pain and vaginal pain. Musculoskeletal: Positive for myalgias. Negative for back pain, gait problem, neck pain and stiffness. Skin: Negative for color change, itching, rash and wound. Neurological: Negative for weakness and numbness. All other systems reviewed and are negative. Physical Exam  Physical Exam  Vitals and nursing note reviewed. Exam conducted with a chaperone present (YARI John ). Constitutional:       General: She is not in acute distress. Appearance: Normal appearance. She is not ill-appearing. HENT:      Head: Normocephalic and atraumatic. Mouth/Throat:      Mouth: Mucous membranes are moist.      Pharynx: Oropharynx is clear. Eyes:      Conjunctiva/sclera: Conjunctivae normal.   Cardiovascular:      Rate and Rhythm: Normal rate and regular rhythm. Pulses:           Dorsalis pedis pulses are 2+ on the right side. Posterior tibial pulses are 2+ on the right side. Pulmonary:      Effort: Pulmonary effort is normal.   Abdominal:      General: There is no distension. Palpations: Abdomen is soft. Tenderness: There is no abdominal tenderness. Hernia: There is no hernia in the right femoral area or right inguinal area. Musculoskeletal:         General: No swelling. Cervical back: Neck supple.       Lumbar back: Normal.      Right hip: No bony tenderness or crepitus. Normal range of motion. Normal strength. Right lower leg: No edema. Left lower leg: No edema. Legs:       Comments: Pain with ROM of hip    Skin:     General: Skin is warm and dry. Capillary Refill: Capillary refill takes less than 2 seconds. Findings: No bruising, erythema or rash. Neurological:      Mental Status: She is alert. Sensory: No sensory deficit. Motor: No weakness. Gait: Gait normal.   Psychiatric:         Mood and Affect: Mood normal.         Behavior: Behavior normal. Behavior is cooperative. Vital Signs  ED Triage Vitals   Temperature Pulse Respirations Blood Pressure SpO2   07/07/23 1156 07/07/23 1156 07/07/23 1156 07/07/23 1156 07/07/23 1156   98.6 °F (37 °C) 72 16 136/73 100 %      Temp Source Heart Rate Source Patient Position - Orthostatic VS BP Location FiO2 (%)   07/07/23 1156 07/07/23 1156 07/07/23 1156 07/07/23 1156 --   Oral Monitor Sitting Left arm       Pain Score       07/07/23 1238       3           Vitals:    07/07/23 1156   BP: 136/73   Pulse: 72   Patient Position - Orthostatic VS: Sitting         Visual Acuity      ED Medications  Medications   ibuprofen (MOTRIN) tablet 600 mg (600 mg Oral Given 7/7/23 1238)       Diagnostic Studies  Results Reviewed     None                 No orders to display              Procedures  Procedures         ED Course  ED Course as of 07/07/23 1538   Fri Jul 07, 2023   1232 Denies chance of pregnancy                                SBIRT 22yo+    Flowsheet Row Most Recent Value   Initial Alcohol Screen: US AUDIT-C     1. How often do you have a drink containing alcohol? 0 Filed at: 07/07/2023 1159   2. How many drinks containing alcohol do you have on a typical day you are drinking? 0 Filed at: 07/07/2023 1159   3a. Male UNDER 65: How often do you have five or more drinks on one occasion? 0 Filed at: 07/07/2023 1159   3b. FEMALE Any Age, or MALE 65+:  How often do you have 4 or more drinks on one occassion? 0 Filed at: 07/07/2023 1154   Audit-C Score 0 Filed at: 07/07/2023 1156   AYDE: How many times in the past year have you. .. Used an illegal drug or used a prescription medication for non-medical reasons? Never Filed at: 07/07/2023 1152                    Medical Decision Making  ddx includes but is not limited to muscle strain, hernia, acute arterial occlusion. Vss. Afebrile. No erythema, warmth, swelling or other signs of infection. No crepitus. Soft. DP and PT 2+, capillary refill <2s, foot warm. History not concerning for claudication- pain is immediate upon walking, also worsens with palpation, ROM. Strength, sensation, ROM all intact. No hernia on exam. No ecchymosis or rash. Denies dark colored urine. No bony tenderness. Atraumatic. Therefore imaging not pursued at this time. Likely msk pain. Plan for supportive care, outpatient follow up. All imaging and/or lab testing discussed with patient, strict return to ED precautions discussed. Patient recommended to follow up promptly with appropriate outpatient provider. Patient and/or family members verbalizes understanding and agrees with plan. Patient and/or family members were given opportunity to ask questions, all questions were answered at this time. Patient is stable for discharge.     Portions of the record may have been created with voice recognition software. Occasional wrong word or "sound a like" substitutions may have occurred due to the inherent limitations of voice recognition software. Read the chart carefully and recognize, using context, where substitutions have occurred. Risk  Prescription drug management.           Disposition  Final diagnoses:   Strain of muscle of right groin region     Time reflects when diagnosis was documented in both MDM as applicable and the Disposition within this note     Time User Action Codes Description Comment    7/7/2023 12:32 PM [facility] Add [S39.011A] Strain of muscle of right groin region       ED Disposition     ED Disposition   Discharge    Condition   Stable    Date/Time   Fri Jul 7, 2023 12:47 PM    Comment   Neto Springer discharge to home/self care. Follow-up Information     Follow up With Specialties Details Why Contact Ryne    Miko Giordano, [facility] Nurse Practitioner, Family Medicine In 1 week  [address]  [phone]            Discharge Medication List as of 7/7/2023 12:47 PM      START taking these medications    Details   Diclofenac Sodium (VOLTAREN) 1 % Apply 2 g topically 4 (four) times a day, Starting Fri 7/7/2023, Normal      naproxen (EC NAPROSYN) 500 MG EC tablet Take 1 tablet (500 mg total) by mouth 2 (two) times a day with meals, Starting Fri 7/7/2023, Until Sat 7/6/2024, Normal         CONTINUE these medications which have NOT CHANGED    Details   ergocalciferol (VITAMIN D2) 50,000 units Take 1 capsule (50,000 Units total) by mouth once a week, Starting Mon 6/12/2023, Normal      hydrOXYzine HCL (ATARAX) 25 mg tablet Take 1 tablet (25 mg total) by mouth 3 (three) times a day as needed for anxiety, Starting Wed 1/18/2023, Normal      sertraline (ZOLOFT) 100 mg tablet Take 1.5 tablets (150 mg total) by mouth daily, Starting Wed 3/22/2023, Normal             No discharge procedures on file.     PDMP Review     None          ED Provider  Electronically Signed by           Rafael Matthew PA-C  07/07/23 3190

## 2023-07-10 NOTE — PROGRESS NOTES
Name: John Andrade      : 1990      MRN: [id number]  Encounter Provider: KASSANDRA Reynoso  Encounter Date: 2023   Encounter department: [address]     1. Right hip pain  Assessment & Plan:  Suspect hip bursitis. Will do XR for reassurance sake. Continue prn volatren gel. - Recommend PT, heat/ice application, home exercises. Orders:  -     XR knee 3 vw right non injury; Future; Expected date: 2023  -     XR hip/pelv 2-3 vws right if performed; Future; Expected date: 2023  -     Ambulatory Referral to Physical Therapy; Future    2. History of knee problem  Assessment & Plan:  - I reassured the patient that she does have a right knee patella. However, to clear up any miscommunication, will do an XR to evaluate for any bony abnormalities of knee which could also be contributing to hip pain. Orders:  -     XR knee 3 vw right non injury; Future; Expected date: 2023         Subjective      John Andrade is a 28 y.o. female  has no past medical history on file. has a past surgical history that includes Bladder repair; Kidney stone surgery; and Ovarian cyst removal (Right). She presents today for an ED f/u. She was seen on 23 for right leg pain. Work-up was normal. Pain was found to be musculoskeletal in nature. She has tried the voltaren gel for the pain which does help. However, she continues to have right hip pain and randomly, intermittently feels as though her hip gives out when she walks. Review of Systems   Constitutional: Negative for chills and fever. HENT: Negative for ear pain and sore throat. Eyes: Negative for pain and visual disturbance. Respiratory: Negative for cough and shortness of breath. Cardiovascular: Negative for chest pain and palpitations. Gastrointestinal: Negative for abdominal pain and vomiting. Genitourinary: Negative for dysuria and hematuria. Musculoskeletal: Positive for arthralgias and myalgias. Negative for back pain. Skin: Negative for color change and rash. Neurological: Negative for seizures and syncope. All other systems reviewed and are negative. Current Outpatient Medications on File Prior to Visit   Medication Sig   • Diclofenac Sodium (VOLTAREN) 1 % Apply 2 g topically 4 (four) times a day   • ergocalciferol (VITAMIN D2) 50,000 units Take 1 capsule (50,000 Units total) by mouth once a week   • hydrOXYzine HCL (ATARAX) 25 mg tablet Take 1 tablet (25 mg total) by mouth 3 (three) times a day as needed for anxiety   • naproxen (EC NAPROSYN) 500 MG EC tablet Take 1 tablet (500 mg total) by mouth 2 (two) times a day with meals   • sertraline (ZOLOFT) 100 mg tablet Take 1.5 tablets (150 mg total) by mouth daily       Objective     /70 (BP Location: Left arm, Patient Position: Sitting, Cuff Size: Standard)   Pulse 78   Temp 97.7 °F (36.5 °C) (Temporal)   Resp 18   Ht 4' 11" (1.499 m)   Wt 39.7 kg (87 lb 9.6 oz)   LMP 06/26/2023 (Exact Date)   SpO2 97%   BMI 17.69 kg/m²     Physical Exam  Vitals and nursing note reviewed. Constitutional:       General: She is not in acute distress. Appearance: She is not ill-appearing, toxic-appearing or diaphoretic. HENT:      Head: Normocephalic and atraumatic. Right Ear: External ear normal.      Left Ear: External ear normal.      Nose: Nose normal.   Eyes:      Conjunctiva/sclera: Conjunctivae normal.   Cardiovascular:      Rate and Rhythm: Normal rate. Pulmonary:      Effort: Pulmonary effort is normal.   Musculoskeletal:         General: Normal range of motion. Cervical back: Normal range of motion and neck supple. Right hip: Tenderness and crepitus present. No deformity or lacerations. Normal range of motion. Normal strength.       Left hip: Normal.      Comments: (+) tenderness to palpation of the greater trochanter  able to stand on right leg for 30 seconds  (+) pain with passive motion of hip flexion     Skin:     General: Skin is warm and dry. Neurological:      Mental Status: She is alert and oriented to person, place, and time. Mental status is at baseline. Psychiatric:         Mood and Affect: Mood normal.         Behavior: Behavior normal.         Thought Content:  Thought content normal.         Judgment: Judgment normal.       Richard Mcmullen

## 2023-07-12 ENCOUNTER — HOSPITAL ENCOUNTER (OUTPATIENT)
Dept: RADIOLOGY | Facility: HOSPITAL | Age: 33
Discharge: HOME | End: 2023-07-12
Payer: COMMERCIAL

## 2023-07-12 ENCOUNTER — OFFICE VISIT (OUTPATIENT)
Dept: FAMILY MEDICINE CLINIC | Facility: CLINIC | Age: 33
End: 2023-07-12

## 2023-07-12 VITALS
HEART RATE: 78 BPM | DIASTOLIC BLOOD PRESSURE: 70 MMHG | RESPIRATION RATE: 18 BRPM | BODY MASS INDEX: 17.66 KG/M2 | OXYGEN SATURATION: 97 % | SYSTOLIC BLOOD PRESSURE: 100 MMHG | TEMPERATURE: 97.7 F | HEIGHT: 59 IN | WEIGHT: 87.6 LBS

## 2023-07-12 DIAGNOSIS — Z87.39 HISTORY OF KNEE PROBLEM: ICD-10-CM

## 2023-07-12 DIAGNOSIS — M25.551 RIGHT HIP PAIN: Primary | ICD-10-CM

## 2023-07-12 DIAGNOSIS — M25.551 RIGHT HIP PAIN: ICD-10-CM

## 2023-07-12 PROCEDURE — 99213 OFFICE O/P EST LOW 20 MIN: CPT

## 2023-07-12 PROCEDURE — 73562 X-RAY EXAM OF KNEE 3: CPT

## 2023-07-12 PROCEDURE — 73502 X-RAY EXAM HIP UNI 2-3 VIEWS: CPT

## 2023-07-12 NOTE — ASSESSMENT & PLAN NOTE
Suspect hip bursitis. Will do XR for reassurance sake. Continue prn volatren gel. - Recommend PT, heat/ice application, home exercises.

## 2023-07-12 NOTE — ASSESSMENT & PLAN NOTE
- I reassured the patient that she does have a right knee patella. However, to clear up any miscommunication, will do an XR to evaluate for any bony abnormalities of knee which could also be contributing to hip pain.

## 2023-08-14 ENCOUNTER — APPOINTMENT (EMERGENCY)
Dept: CT IMAGING | Facility: HOSPITAL | Age: 33
End: 2023-08-14
Payer: COMMERCIAL

## 2023-08-14 ENCOUNTER — HOSPITAL ENCOUNTER (INPATIENT)
Facility: HOSPITAL | Age: 33
LOS: 1 days | Discharge: HOME | End: 2023-08-16
Attending: EMERGENCY MEDICINE | Admitting: OBSTETRICS & GYNECOLOGY
Payer: COMMERCIAL

## 2023-08-14 ENCOUNTER — APPOINTMENT (EMERGENCY)
Dept: ULTRASOUND IMAGING | Facility: HOSPITAL | Age: 33
End: 2023-08-14
Payer: COMMERCIAL

## 2023-08-14 DIAGNOSIS — N70.93 RIGHT TUBO-OVARIAN ABSCESS: Primary | ICD-10-CM

## 2023-08-14 LAB
ALBUMIN SERPL BCP-MCNC: 4.1 G/DL (ref 3.5–5)
ALP SERPL-CCNC: 71 U/L (ref 34–104)
ALT SERPL W P-5'-P-CCNC: 13 U/L (ref 7–52)
ANION GAP SERPL CALCULATED.3IONS-SCNC: 5 MMOL/L
AST SERPL W P-5'-P-CCNC: 21 U/L (ref 13–39)
BACTERIA UR QL AUTO: NORMAL /HPF
BASOPHILS # BLD AUTO: 0.04 THOUSANDS/ÂΜL (ref 0–0.1)
BASOPHILS NFR BLD AUTO: 1 % (ref 0–1)
BILIRUB SERPL-MCNC: 0.47 MG/DL (ref 0.2–1)
BILIRUB UR QL STRIP: NEGATIVE
BUN SERPL-MCNC: 10 MG/DL (ref 5–25)
CALCIUM SERPL-MCNC: 9.4 MG/DL (ref 8.4–10.2)
CHLORIDE SERPL-SCNC: 105 MMOL/L (ref 96–108)
CLARITY UR: CLEAR
CO2 SERPL-SCNC: 26 MMOL/L (ref 21–32)
COLOR UR: YELLOW
CREAT SERPL-MCNC: 0.48 MG/DL (ref 0.6–1.3)
EOSINOPHIL # BLD AUTO: 0.02 THOUSAND/ÂΜL (ref 0–0.61)
EOSINOPHIL NFR BLD AUTO: 0 % (ref 0–6)
ERYTHROCYTE [DISTWIDTH] IN BLOOD BY AUTOMATED COUNT: 12.7 % (ref 11.6–15.1)
EXT PREGNANCY TEST URINE: NEGATIVE
GFR SERPL CREATININE-BSD FRML MDRD: 128 ML/MIN/1.73SQ M
GLUCOSE SERPL-MCNC: 105 MG/DL (ref 65–140)
GLUCOSE UR STRIP-MCNC: NEGATIVE MG/DL
HCT VFR BLD AUTO: 40.4 % (ref 34.8–46.1)
HGB BLD-MCNC: 13.2 G/DL (ref 11.5–15.4)
HGB UR QL STRIP.AUTO: ABNORMAL
IMM GRANULOCYTES # BLD AUTO: 0.02 THOUSAND/UL (ref 0–0.2)
IMM GRANULOCYTES NFR BLD AUTO: 0 % (ref 0–2)
KETONES UR STRIP-MCNC: NEGATIVE MG/DL
LEUKOCYTE ESTERASE UR QL STRIP: NEGATIVE
LYMPHOCYTES # BLD AUTO: 1.64 THOUSANDS/ÂΜL (ref 0.6–4.47)
LYMPHOCYTES NFR BLD AUTO: 24 % (ref 14–44)
MCH RBC QN AUTO: 29.5 PG (ref 26.8–34.3)
MCHC RBC AUTO-ENTMCNC: 32.7 G/DL (ref 31.4–37.4)
MCV RBC AUTO: 90 FL (ref 82–98)
MONOCYTES # BLD AUTO: 0.42 THOUSAND/ÂΜL (ref 0.17–1.22)
MONOCYTES NFR BLD AUTO: 6 % (ref 4–12)
NEUTROPHILS # BLD AUTO: 4.83 THOUSANDS/ÂΜL (ref 1.85–7.62)
NEUTS SEG NFR BLD AUTO: 69 % (ref 43–75)
NITRITE UR QL STRIP: NEGATIVE
NON-SQ EPI CELLS URNS QL MICRO: NORMAL /HPF
NRBC BLD AUTO-RTO: 0 /100 WBCS
PH UR STRIP.AUTO: 7.5 [PH] (ref 4.5–8)
PLATELET # BLD AUTO: 336 THOUSANDS/UL (ref 149–390)
PMV BLD AUTO: 9.3 FL (ref 8.9–12.7)
POTASSIUM SERPL-SCNC: 4.7 MMOL/L (ref 3.5–5.3)
PROT SERPL-MCNC: 7.2 G/DL (ref 6.4–8.4)
PROT UR STRIP-MCNC: NEGATIVE MG/DL
RBC # BLD AUTO: 4.47 MILLION/UL (ref 3.81–5.12)
RBC #/AREA URNS AUTO: NORMAL /HPF
SODIUM SERPL-SCNC: 136 MMOL/L (ref 135–147)
SP GR UR STRIP.AUTO: 1.02 (ref 1–1.03)
UROBILINOGEN UR QL STRIP.AUTO: 0.2 E.U./DL
WBC # BLD AUTO: 6.97 THOUSAND/UL (ref 4.31–10.16)
WBC #/AREA URNS AUTO: NORMAL /HPF

## 2023-08-14 PROCEDURE — G1004 CDSM NDSC: HCPCS

## 2023-08-14 PROCEDURE — NC001 PR NO CHARGE: Performed by: EMERGENCY MEDICINE

## 2023-08-14 PROCEDURE — 99222 1ST HOSP IP/OBS MODERATE 55: CPT | Performed by: OBSTETRICS & GYNECOLOGY

## 2023-08-14 PROCEDURE — 87491 CHLMYD TRACH DNA AMP PROBE: CPT

## 2023-08-14 PROCEDURE — 96361 HYDRATE IV INFUSION ADD-ON: CPT

## 2023-08-14 PROCEDURE — 81001 URINALYSIS AUTO W/SCOPE: CPT

## 2023-08-14 PROCEDURE — 87660 TRICHOMONAS VAGIN DIR PROBE: CPT

## 2023-08-14 PROCEDURE — 99284 EMERGENCY DEPT VISIT MOD MDM: CPT

## 2023-08-14 PROCEDURE — 76856 US EXAM PELVIC COMPLETE: CPT

## 2023-08-14 PROCEDURE — 81025 URINE PREGNANCY TEST: CPT | Performed by: EMERGENCY MEDICINE

## 2023-08-14 PROCEDURE — 87480 CANDIDA DNA DIR PROBE: CPT

## 2023-08-14 PROCEDURE — 99285 EMERGENCY DEPT VISIT HI MDM: CPT | Performed by: EMERGENCY MEDICINE

## 2023-08-14 PROCEDURE — 36415 COLL VENOUS BLD VENIPUNCTURE: CPT | Performed by: EMERGENCY MEDICINE

## 2023-08-14 PROCEDURE — 76830 TRANSVAGINAL US NON-OB: CPT

## 2023-08-14 PROCEDURE — 80053 COMPREHEN METABOLIC PANEL: CPT | Performed by: EMERGENCY MEDICINE

## 2023-08-14 PROCEDURE — 74177 CT ABD & PELVIS W/CONTRAST: CPT

## 2023-08-14 PROCEDURE — 96374 THER/PROPH/DIAG INJ IV PUSH: CPT

## 2023-08-14 PROCEDURE — 96375 TX/PRO/DX INJ NEW DRUG ADDON: CPT

## 2023-08-14 PROCEDURE — 87510 GARDNER VAG DNA DIR PROBE: CPT

## 2023-08-14 PROCEDURE — 85025 COMPLETE CBC W/AUTO DIFF WBC: CPT | Performed by: EMERGENCY MEDICINE

## 2023-08-14 PROCEDURE — 87591 N.GONORRHOEAE DNA AMP PROB: CPT

## 2023-08-14 RX ORDER — ACETAMINOPHEN 325 MG/1
975 TABLET ORAL EVERY 8 HOURS PRN
Status: DISCONTINUED | OUTPATIENT
Start: 2023-08-14 | End: 2023-08-16 | Stop reason: HOSPADM

## 2023-08-14 RX ORDER — METRONIDAZOLE 500 MG/100ML
500 INJECTION, SOLUTION INTRAVENOUS EVERY 8 HOURS
Status: DISCONTINUED | OUTPATIENT
Start: 2023-08-14 | End: 2023-08-16

## 2023-08-14 RX ORDER — KETOROLAC TROMETHAMINE 30 MG/ML
15 INJECTION, SOLUTION INTRAMUSCULAR; INTRAVENOUS ONCE
Status: COMPLETED | OUTPATIENT
Start: 2023-08-14 | End: 2023-08-14

## 2023-08-14 RX ORDER — SODIUM CHLORIDE 9 MG/ML
150 INJECTION, SOLUTION INTRAVENOUS CONTINUOUS
Status: DISCONTINUED | OUTPATIENT
Start: 2023-08-14 | End: 2023-08-16 | Stop reason: HOSPADM

## 2023-08-14 RX ORDER — HYDROXYZINE HYDROCHLORIDE 25 MG/1
25 TABLET, FILM COATED ORAL 3 TIMES DAILY PRN
Status: DISCONTINUED | OUTPATIENT
Start: 2023-08-14 | End: 2023-08-16 | Stop reason: HOSPADM

## 2023-08-14 RX ORDER — IBUPROFEN 600 MG/1
600 TABLET ORAL EVERY 6 HOURS PRN
Status: DISCONTINUED | OUTPATIENT
Start: 2023-08-14 | End: 2023-08-16 | Stop reason: HOSPADM

## 2023-08-14 RX ORDER — OXYCODONE HYDROCHLORIDE 5 MG/1
5 TABLET ORAL EVERY 4 HOURS PRN
Status: DISCONTINUED | OUTPATIENT
Start: 2023-08-14 | End: 2023-08-16 | Stop reason: HOSPADM

## 2023-08-14 RX ORDER — CEFTRIAXONE 1 G/50ML
1000 INJECTION, SOLUTION INTRAVENOUS ONCE
Status: COMPLETED | OUTPATIENT
Start: 2023-08-14 | End: 2023-08-14

## 2023-08-14 RX ADMIN — IOHEXOL 85 ML: 350 INJECTION, SOLUTION INTRAVENOUS at 13:31

## 2023-08-14 RX ADMIN — CEFTRIAXONE 1000 MG: 1 INJECTION, SOLUTION INTRAVENOUS at 19:22

## 2023-08-14 RX ADMIN — SODIUM CHLORIDE 150 ML/HR: 0.9 INJECTION, SOLUTION INTRAVENOUS at 18:09

## 2023-08-14 RX ADMIN — SODIUM CHLORIDE 1000 ML: 0.9 INJECTION, SOLUTION INTRAVENOUS at 12:33

## 2023-08-14 RX ADMIN — METRONIDAZOLE 500 MG: 500 INJECTION, SOLUTION INTRAVENOUS at 18:09

## 2023-08-14 RX ADMIN — SODIUM CHLORIDE 100 MG: 9 INJECTION, SOLUTION INTRAVENOUS at 20:09

## 2023-08-14 RX ADMIN — KETOROLAC TROMETHAMINE 15 MG: 30 INJECTION, SOLUTION INTRAMUSCULAR; INTRAVENOUS at 12:34

## 2023-08-14 NOTE — Clinical Note
Case was discussed with Lesli Mcgee and the patient's admission status was agreed to be Admission Status: inpatient status to the service of Dr. Jorge Thrasher .

## 2023-08-14 NOTE — ED PROVIDER NOTES
History  Chief Complaint   Patient presents with   • Pelvic Pain     Patient c/o right lower pelvic pain that she describes as on her ovary. Patient denies N/V/D. Not taking medication for pain at home.      28 y.o. F w/h/o kidney stones, ovarian cysts s/p removal p/w RLQ pain x 5 days. Constant. "Normal pain." Nonradiating. Hasn't taken anything for pain. Sexual active, does not use barrier protection. Just      History provided by:  Patient   used: Yes ([employer] [id number])    Abdominal Pain  Pain location:  RLQ  Duration:  5 days  Timing:  Constant  Context: previous surgery    Ineffective treatments:  None tried  Associated symptoms: no diarrhea, no dysuria, no fever, no hematuria, no nausea, no vaginal discharge and no vomiting        Prior to Admission Medications   Prescriptions Last Dose Informant Patient Reported? Taking? Diclofenac Sodium (VOLTAREN) 1 % Not Taking  No No   Sig: Apply 2 g topically 4 (four) times a day   Patient not taking: Reported on 8/14/2023   ergocalciferol (VITAMIN D2) 50,000 units Not Taking  No No   Sig: Take 1 capsule (50,000 Units total) by mouth once a week   Patient not taking: Reported on 8/14/2023   hydrOXYzine HCL (ATARAX) 25 mg tablet Not Taking  No No   Sig: Take 1 tablet (25 mg total) by mouth 3 (three) times a day as needed for anxiety   Patient not taking: Reported on 8/14/2023   naproxen (EC NAPROSYN) 500 MG EC tablet Not Taking  No No   Sig: Take 1 tablet (500 mg total) by mouth 2 (two) times a day with meals   Patient not taking: Reported on 8/14/2023   sertraline (ZOLOFT) 100 mg tablet Not Taking  No No   Sig: Take 1.5 tablets (150 mg total) by mouth daily   Patient not taking: Reported on 8/14/2023      Facility-Administered Medications: None       History reviewed. No pertinent past medical history.     Past Surgical History:   Procedure Laterality Date   • BLADDER REPAIR     • KIDNEY STONE SURGERY     • OVARIAN CYST REMOVAL Right        Family History   Problem Relation Age of Onset   • Diabetes Father      I have reviewed and agree with the history as documented. E-Cigarette/Vaping     E-Cigarette/Vaping Substances   • Nicotine Yes    • THC No    • CBD No    • Flavoring No    • Other No    • Unknown No      Social History     Tobacco Use   • Smoking status: Some Days   • Smokeless tobacco: Never   • Tobacco comments:     hookah   Substance Use Topics   • Alcohol use: Not Currently   • Drug use: Yes     Types: Marijuana     Comment: social       Review of Systems   Constitutional: Negative for fever. Gastrointestinal: Positive for abdominal pain. Negative for diarrhea, nausea and vomiting. Genitourinary: Positive for frequency. Negative for dysuria, hematuria and vaginal discharge. Physical Exam  Physical Exam  Vitals and nursing note reviewed. Constitutional:       General: She is not in acute distress. Appearance: Normal appearance. She is well-developed. She is not ill-appearing, toxic-appearing or diaphoretic. HENT:      Head: Normocephalic and atraumatic. Eyes:      General: No scleral icterus. Neck:      Vascular: No JVD. Trachea: Trachea normal.   Cardiovascular:      Rate and Rhythm: Normal rate and regular rhythm. Heart sounds: Normal heart sounds. No murmur heard. No friction rub. Pulmonary:      Effort: Pulmonary effort is normal. No accessory muscle usage or respiratory distress. Breath sounds: Normal breath sounds. No stridor. No wheezing, rhonchi or rales. Abdominal:      General: There is no distension. Palpations: Abdomen is soft. Abdomen is not rigid. There is no mass. Tenderness: There is abdominal tenderness in the right lower quadrant. There is no right CVA tenderness, left CVA tenderness, guarding or rebound. Positive signs include McBurney's sign. Negative signs include Polanco's sign. Musculoskeletal:      Cervical back: Normal range of motion.    Skin:     General: Skin is warm and dry. Coloration: Skin is not pale. Findings: No rash. Neurological:      Mental Status: She is alert. GCS: GCS eye subscore is 4. GCS verbal subscore is 5. GCS motor subscore is 6. Psychiatric:         Behavior: Behavior normal.         Vital Signs  ED Triage Vitals [08/14/23 1145]   Temperature Pulse Respirations Blood Pressure SpO2   98.2 °F (36.8 °C) (!) 106 18 100/68 98 %      Temp Source Heart Rate Source Patient Position - Orthostatic VS BP Location FiO2 (%)   Oral Monitor Sitting Right arm --      Pain Score       10 - Worst Possible Pain           Vitals:    08/14/23 1733 08/14/23 1943 08/14/23 1945 08/14/23 2053   BP: 114/52 125/72 125/72 123/72   Pulse: 56 67 66 65   Patient Position - Orthostatic VS: Lying Lying Lying Sitting         Visual Acuity      ED Medications  Medications   sodium chloride 0.9 % infusion (150 mL/hr Intravenous Restarted 8/14/23 2016)   doxycycline (VIBRAMYCIN) 100 mg in sodium chloride 0.9 % 100 mL IVPB (100 mg Intravenous New Bag 8/14/23 2009)   metroNIDAZOLE (FLAGYL) IVPB (premix) 500 mg 100 mL (0 mg Intravenous Stopped 8/14/23 1922)   acetaminophen (TYLENOL) tablet 975 mg (has no administration in time range)   ibuprofen (MOTRIN) tablet 600 mg (has no administration in time range)   oxyCODONE (ROXICODONE) IR tablet 5 mg (has no administration in time range)   sodium chloride 0.9 % bolus 1,000 mL (0 mL Intravenous Stopped 8/14/23 1816)   ketorolac (TORADOL) injection 15 mg (15 mg Intravenous Given 8/14/23 1234)   iohexol (OMNIPAQUE) 350 MG/ML injection (SINGLE-DOSE) 85 mL (85 mL Intravenous Given 8/14/23 1331)   cefTRIAXone (ROCEPHIN) IVPB (premix in dextrose) 1,000 mg 50 mL (0 mg Intravenous Stopped 8/14/23 2008)       Diagnostic Studies  Results Reviewed     Procedure Component Value Units Date/Time    Chlamydia/GC amplified DNA by PCR [[id number]] Collected: 08/14/23 1837    Lab Status:  In process Specimen: Endocervical Updated: 08/14/23 1841 VAGINOSIS DNA PROBE (AFFIRM) [[id number]] Collected: 08/14/23 1837    Lab Status:  In process Specimen: Genital from Vaginal Updated: 08/14/23 1849    Comprehensive metabolic panel [[id number]]  (Abnormal) Collected: 08/14/23 1232    Lab Status: Final result Specimen: Blood from Arm, Left Updated: 08/14/23 1304     Sodium 136 mmol/L      Potassium 4.7 mmol/L      Chloride 105 mmol/L      CO2 26 mmol/L      ANION GAP 5 mmol/L      BUN 10 mg/dL      Creatinine 0.48 mg/dL      Glucose 105 mg/dL      Calcium 9.4 mg/dL      AST 21 U/L      ALT 13 U/L      Alkaline Phosphatase 71 U/L      Total Protein 7.2 g/dL      Albumin 4.1 g/dL      Total Bilirubin 0.47 mg/dL      eGFR 128 ml/min/1.73sq m     Narrative:      [place] guidelines for Chronic Kidney Disease (CKD):   •  Stage 1 with normal or high GFR (GFR > 90 mL/min/1.73 square meters)  •  Stage 2 Mild CKD (GFR = 60-89 mL/min/1.73 square meters)  •  Stage 3A Moderate CKD (GFR = 45-59 mL/min/1.73 square meters)  •  Stage 3B Moderate CKD (GFR = 30-44 mL/min/1.73 square meters)  •  Stage 4 Severe CKD (GFR = 15-29 mL/min/1.73 square meters)  •  Stage 5 End Stage CKD (GFR <15 mL/min/1.73 square meters)  Note: GFR calculation is accurate only with a steady state creatinine    Urine Microscopic [[id number]]  (Normal) Collected: 08/14/23 1211    Lab Status: Final result Specimen: Urine, Clean Catch Updated: 08/14/23 1301     RBC, UA 1-2 /hpf      WBC, UA None Seen /hpf      Epithelial Cells Occasional /hpf      Bacteria, UA None Seen /hpf     CBC and differential [[id number]] Collected: 08/14/23 1232    Lab Status: Final result Specimen: Blood from Arm, Left Updated: 08/14/23 1245     WBC 6.97 Thousand/uL      RBC 4.47 Million/uL      Hemoglobin 13.2 g/dL      Hematocrit 40.4 %      MCV 90 fL      MCH 29.5 pg      MCHC 32.7 g/dL      RDW 12.7 %      MPV 9.3 fL      Platelets 529 Thousands/uL      nRBC 0 /100 WBCs      Neutrophils Relative 69 %      Immat GRANS % 0 %      Lymphocytes Relative 24 %      Monocytes Relative 6 %      Eosinophils Relative 0 %      Basophils Relative 1 %      Neutrophils Absolute 4.83 Thousands/µL      Immature Grans Absolute 0.02 Thousand/uL      Lymphocytes Absolute 1.64 Thousands/µL      Monocytes Absolute 0.42 Thousand/µL      Eosinophils Absolute 0.02 Thousand/µL      Basophils Absolute 0.04 Thousands/µL     POCT pregnancy, urine [[id number]]  (Normal) Resulted: 08/14/23 1217    Lab Status: Final result Updated: 08/14/23 1217     EXT Preg Test, Ur Negative     Control --    Urine Macroscopic, POC [[id number]]  (Abnormal) Collected: 08/14/23 1211    Lab Status: Final result Specimen: Urine Updated: 08/14/23 1213     Color, UA Yellow     Clarity, UA Clear     pH, UA 7.5     Leukocytes, UA Negative     Nitrite, UA Negative     Protein, UA Negative mg/dl      Glucose, UA Negative mg/dl      Ketones, UA Negative mg/dl      Urobilinogen, UA 0.2 E.U./dl      Bilirubin, UA Negative     Occult Blood, UA Moderate     Specific Gravity, UA 1.025    Narrative:      CLINITEK RESULT                 US pelvis complete w transvaginal   Final Result by Hiren Leija MD (08/14 1640)      1. Findings concerning for right adnexal tubo-ovarian abscess. No normal right ovarian tissue is seen. 2.  Normal left ovary. 3.  Normal uterus. The study was marked in [facility] for immediate notification. Workstation performed: [id number]         CT abdomen pelvis with contrast   ED Interpretation by Wanda Khanna DO (08/14 5663)   Interpreted by me as no free air      Final Result by Devorah Patterson MD (08/14 1418)      1) Two cystic structures measuring 2.3 x 1.9 cm and 2.3 x 1.3 cm, versus a single bilobed cystic structure in the right side of the pelvis, anterior to the right external iliac vessels, and with the larger cystic structure partially protruding into the    anterior abdominal wall.  The appearance is suggestive of cysts within the right ovary, and/or possible hydrosalpinx. Recommend further evaluation with pelvic ultrasound as given right lower quadrant pain and unusual location (if indeed the right ovary),    ovarian torsion is not excluded. 2) Unremarkable CT appearance of the uterus and left ovary. 3) Mild circumferential wall thickening of the urinary bladder, which may indicate cystitis versus underdistention. 4) Normal appendix. No bowel obstruction. Evaluation for bowel inflammation elsewhere somewhat limited by motion artifact, underdistention and lack of oral contrast, however no convincing inflammatory changes. Please note mild inflammation may not be    apparent. 5) Small amount of free fluid in the dependent portion of the pelvis, likely physiologic. 6) Additional findings as above. The study was marked in [facility] for immediate notification. Workstation performed: [id number]                    Procedures  Procedures         ED Course  ED Course as of 08/14/23 2100   Mon Aug 14, 2023   1201 Pulse(!): 106  Mild tachycardia   1217 PREGNANCY TEST URINE: Negative  Negative   1247 WBC: 6.97  Normal   1247 Hemoglobin: 13.2  Normal   1302 Bacteria, UA: None Seen  Negative   1302 WBC, UA: None Seen  Negative   1304 Comprehensive metabolic panel(!)  Normal   1318 Pulse: 84  Normalized   [address] [id number], discussed results of CT with pt and plan for US. Pt playing on cell phone. Reports pain is better. [address] [facility] [id number], discussed US results with pt.   Dajuan Villasenor Text to [phone] Discussed case with OBGYN resident Stacy. [id number] will admit and place orders. SBIRT 22yo+    Flowsheet Row Most Recent Value   Initial Alcohol Screen: US AUDIT-C     1. How often do you have a drink containing alcohol? 0 Filed at: 08/14/2023 1316   2. How many drinks containing alcohol do you have on a typical day you are drinking?   0 Filed at: 08/14/2023 1316   3a. Male UNDER 65: How often do you have five or more drinks on one occasion? 0 Filed at: 08/14/2023 1316   3b. FEMALE Any Age, or MALE 65+: How often do you have 4 or more drinks on one occassion? 0 Filed at: 08/14/2023 1316   Audit-C Score 0 Filed at: 08/14/2023 1316   AYDE: How many times in the past year have you. .. Used an illegal drug or used a prescription medication for non-medical reasons? Never Filed at: 08/14/2023 1316                    Medical Decision Making  Will check CBC as marker of infection, CMP to r/o hepatitis/biliary disease/ZULEMA/electrolyte abnormalities, urine to r/o UTI, urine preg to r/o ectopic pregnancy, CT A/P to r/o appendicitis/ovarian cyst/kidney stone. Amount and/or Complexity of Data Reviewed  Labs: ordered. Decision-making details documented in ED Course. Radiology: ordered. Risk  Prescription drug management. Disposition  Final diagnoses:   Right tubo-ovarian abscess     Time reflects when diagnosis was documented in both MDM as applicable and the Disposition within this note     Time User Action Codes Description Comment    8/14/2023  5:19 PM Seb Arzate [N70.93] Right tubo-ovarian abscess       ED Disposition     ED Disposition   Admit    Condition   Stable    Date/Time   Mon Aug 14, 2023  6:24 PM    Comment   Case was discussed with Chata Rice and the patient's admission status was agreed to be Admission Status: observation status to the service of Dr. August .            Follow-up Information    None         Current Discharge Medication List      CONTINUE these medications which have NOT CHANGED    Details   Diclofenac Sodium (VOLTAREN) 1 % Apply 2 g topically 4 (four) times a day  Qty: 100 g, Refills: 0    Associated Diagnoses: Strain of muscle of right groin region      ergocalciferol (VITAMIN D2) 50,000 units Take 1 capsule (50,000 Units total) by mouth once a week  Qty: 12 capsule, Refills: 0    Associated Diagnoses: Vitamin D deficiency      hydrOXYzine HCL (ATARAX) 25 mg tablet Take 1 tablet (25 mg total) by mouth 3 (three) times a day as needed for anxiety  Qty: 30 tablet, Refills: 2    Associated Diagnoses: Anxiety and depression      naproxen (EC NAPROSYN) 500 MG EC tablet Take 1 tablet (500 mg total) by mouth 2 (two) times a day with meals  Qty: 10 tablet, Refills: 0    Associated Diagnoses: Strain of muscle of right groin region      sertraline (ZOLOFT) 100 mg tablet Take 1.5 tablets (150 mg total) by mouth daily  Qty: 180 tablet, Refills: 0    Associated Diagnoses: Anxiety and depression             No discharge procedures on file.     PDMP Review     None          ED Provider  Electronically Signed by           [address] [place] [address]   08/14/23 0656

## 2023-08-14 NOTE — ASSESSMENT & PLAN NOTE
Ultrasound on 8/14/2023 showed: "findings concerning for right adnexal tubo-ovarian abscess.  No normal right ovarian tissue seen."  Continues to remain afebrile   Gonorrhea/chlamydia negative   Affirm probe pending   Rapid HIV/RPR/Hep B/Hep C all negative   Regular Diet  Now s/p 24 hours of IV Doxycycline, flagyl and Ceftriaxone    Consider transition to PO antibiotics today

## 2023-08-14 NOTE — H&P
H&P - Gynecology   Neto Springer 28 y.o. female MRN: [id number]  Unit/Bed#: ED-31 Encounter: [id number]      Assessment/Plan     Generalized abdominal pain  Assessment & Plan  Motrin 600mg Q6 /Tylenol 975mg Q8 as needed for mild/moderate pain   Lisa 5mg for breakthrough pain      * Right tubo-ovarian abscess  Assessment & Plan  Ultrasound on 8/14/2023 showed: "findings concerning for right adnexal tubo-ovarian abscess. No normal right ovarian tissue seen."  Patient currently afebrile without leukocytosis. Gonorrhea/chlamydia and affirm swab collected  Admit to GYN for observation  Regular Diet  Begin empiric treatment with:  - Doxycycline  mg every 12  - Flagyl  mg twice daily  - Ceftriaxone IV 1 g daily      History of Present Illness   Harvey Coleman is a 28 y.o. Maxwell Ferrell who presents with a 5-day history of right lower quadrant pain which she describes as constant and nonradiating. This pain is associated with urinary frequency patient denies fever chills nausea vomiting diarrhea constipation or vaginal discharge. Is sexually active with 1 partner and does not use barrier contraception. Lab work in the emergency room was negative for leukocytosis or band shift and patient was afebrile. Urine macroscopic was significant only for moderate amount of occult blood and showed no signs of UTI. UPT was also negative. CTAP was obtained in the emergency room which showed 2 cystic structures in the right pelvis which was read as a possible hydrosalpinx but with recommendation for ultrasound to rule out TOA. Ultrasound was performed and showed "findings concerning for right adnexal tubo-ovarian abscess. No normal right ovarian tissue seen."   Patient does report a long history of ovarian cysts for which she has received multiple surgeries. She states the most recent of these was 4 years ago.   She is not sure if the surgery was performed laparoscopically or not, but she does state on the last surgery there was an injury to her bladder which required surgical repair. On abdominal exam patient does have a well-healed Pfannenstiel incision. No port site incisions were noted although this does not exclude prior laparoscopic surgeries. The surgeries were performed in [place] and therefore we have no records. Patient's past obstetrical history is significant for 1 SAB approximately 13 years ago. Patient states that this was secondary to her cysts. Review of Systems   Constitutional: Negative for chills and fever. Respiratory: Negative for cough, shortness of breath and wheezing. Cardiovascular: Negative for chest pain and leg swelling. Gastrointestinal: Positive for abdominal pain. Negative for diarrhea, nausea and vomiting. Genitourinary: Positive for frequency and pelvic pain. Negative for vaginal bleeding and vaginal discharge. Musculoskeletal: Negative for back pain. Neurological: Negative for weakness, light-headedness and headaches. Historical Information   History reviewed. No pertinent past medical history.   Past Surgical History:   Procedure Laterality Date   • BLADDER REPAIR     • KIDNEY STONE SURGERY     • OVARIAN CYST REMOVAL Right      OB/GYN History:   Family History   Problem Relation Age of Onset   • Diabetes Father      Social History   Social History     Substance and Sexual Activity   Alcohol Use Not Currently     Social History     Substance and Sexual Activity   Drug Use Yes   • Types: Marijuana    Comment: social     Social History     Tobacco Use   Smoking Status Some Days   Smokeless Tobacco Never   Tobacco Comments    hookah       Meds/Allergies   (Not in a hospital admission)    No Known Allergies    Objective   /72 (BP Location: Right arm)   Pulse 67   Temp 98.2 °F (36.8 °C) (Oral)   Resp 16   Wt 40.4 kg (89 lb 1.1 oz)   LMP 08/01/2023 (Exact Date)   SpO2 97%   BMI 17.99 kg/m²       Intake/Output Summary (Last 24 hours) at 8/14/2023 1949  Last data filed at 8/14/2023 1922  Gross per 24 hour   Intake 1100 ml   Output --   Net 1100 ml         Physical Exam  Exam conducted with a chaperone present. Constitutional:       General: She is not in acute distress. Appearance: Normal appearance. HENT:      Head: Normocephalic and atraumatic. Cardiovascular:      Rate and Rhythm: Normal rate. Pulses: Normal pulses. Pulmonary:      Effort: Pulmonary effort is normal. No respiratory distress. Breath sounds: Normal breath sounds. Abdominal:      General: Abdomen is flat. Comments: Well-healed Pfannenstiel incision  General tenderness to palpation most significant in the right lower quadrant. Genitourinary:     Vagina: No signs of injury. Cervix: Discharge present. Adnexa:         Right: Tenderness and fullness present. Comments: Moderate amount of watery white discharge present exuding from the cervical os and pooling in the vagina. On bimanual exam fullness and tenderness of the right adnexal region was palpated. Skin:     General: Skin is warm and dry. Neurological:      General: No focal deficit present. Mental Status: She is alert.    Psychiatric:         Mood and Affect: Mood normal.         Behavior: Behavior normal.         Lab Results:   Admission on 08/14/2023   Component Date Value   • EXT Preg Test, Ur 08/14/2023 Negative    • Color, UA 08/14/2023 Yellow    • Clarity, UA 08/14/2023 Clear    • pH, UA 08/14/2023 7.5    • Leukocytes, UA 08/14/2023 Negative    • Nitrite, UA 08/14/2023 Negative    • Protein, UA 08/14/2023 Negative    • Glucose, UA 08/14/2023 Negative    • Ketones, UA 08/14/2023 Negative    • Urobilinogen, UA 08/14/2023 0.2    • Bilirubin, UA 08/14/2023 Negative    • Occult Blood, UA 08/14/2023 Moderate (A)    • Specific [place], UA 08/14/2023 1.025    • RBC, UA 08/14/2023 1-2    • WBC, UA 08/14/2023 None Seen    • Epithelial Cells 08/14/2023 Occasional    • Bacteria, UA 08/14/2023 None Seen    • WBC 08/14/2023 6.97    • RBC 08/14/2023 4.47    • Hemoglobin 08/14/2023 13.2    • Hematocrit 08/14/2023 40.4    • MCV 08/14/2023 90    • MCH 08/14/2023 29.5    • MCHC 08/14/2023 32.7    • RDW 08/14/2023 12.7    • MPV 08/14/2023 9.3    • Platelets 70/94/7199 336    • nRBC 08/14/2023 0    • Neutrophils Relative 08/14/2023 69    • Immat GRANS % 08/14/2023 0    • Lymphocytes Relative 08/14/2023 24    • Monocytes Relative 08/14/2023 6    • Eosinophils Relative 08/14/2023 0    • Basophils Relative 08/14/2023 1    • Neutrophils Absolute 08/14/2023 4.83    • Immature Grans Absolute 08/14/2023 0.02    • Lymphocytes Absolute 08/14/2023 1.64    • Monocytes Absolute 08/14/2023 0.42    • Eosinophils Absolute 08/14/2023 0.02    • Basophils Absolute 08/14/2023 0.04    • Sodium 08/14/2023 136    • Potassium 08/14/2023 4.7    • Chloride 08/14/2023 105    • CO2 08/14/2023 26    • ANION GAP 08/14/2023 5    • BUN 08/14/2023 10    • Creatinine 08/14/2023 0.48 (L)    • Glucose 08/14/2023 105    • Calcium 08/14/2023 9.4    • AST 08/14/2023 21    • ALT 08/14/2023 13    • Alkaline Phosphatase 08/14/2023 71    • Total Protein 08/14/2023 7.2    • Albumin 08/14/2023 4.1    • Total Bilirubin 08/14/2023 0.47    • eGFR 08/14/2023 128         Anali Fletcher MD  OBGYN PGY-2  8/14/2023 7:49 PM

## 2023-08-15 LAB
ALBUMIN SERPL BCP-MCNC: 3 G/DL (ref 3.5–5)
ALP SERPL-CCNC: 53 U/L (ref 34–104)
ALT SERPL W P-5'-P-CCNC: 8 U/L (ref 7–52)
ANION GAP SERPL CALCULATED.3IONS-SCNC: 4 MMOL/L
AST SERPL W P-5'-P-CCNC: 10 U/L (ref 13–39)
BASOPHILS # BLD AUTO: 0.02 THOUSANDS/ÂΜL (ref 0–0.1)
BASOPHILS NFR BLD AUTO: 0 % (ref 0–1)
BILIRUB SERPL-MCNC: 0.27 MG/DL (ref 0.2–1)
BUN SERPL-MCNC: 10 MG/DL (ref 5–25)
C TRACH DNA SPEC QL NAA+PROBE: NEGATIVE
CALCIUM ALBUM COR SERPL-MCNC: 8.5 MG/DL (ref 8.3–10.1)
CALCIUM SERPL-MCNC: 7.7 MG/DL (ref 8.4–10.2)
CHLORIDE SERPL-SCNC: 112 MMOL/L (ref 96–108)
CO2 SERPL-SCNC: 22 MMOL/L (ref 21–32)
CREAT SERPL-MCNC: 0.47 MG/DL (ref 0.6–1.3)
EOSINOPHIL # BLD AUTO: 0.05 THOUSAND/ÂΜL (ref 0–0.61)
EOSINOPHIL NFR BLD AUTO: 1 % (ref 0–6)
ERYTHROCYTE [DISTWIDTH] IN BLOOD BY AUTOMATED COUNT: 12.8 % (ref 11.6–15.1)
GFR SERPL CREATININE-BSD FRML MDRD: 129 ML/MIN/1.73SQ M
GLUCOSE SERPL-MCNC: 118 MG/DL (ref 65–140)
HBV SURFACE AG SER QL: NORMAL
HCT VFR BLD AUTO: 35 % (ref 34.8–46.1)
HCV AB SER QL: NORMAL
HGB BLD-MCNC: 10.9 G/DL (ref 11.5–15.4)
HIV 1+2 AB+HIV1 P24 AG SERPL QL IA: NORMAL
HIV1 P24 AG SER QL: NORMAL
IMM GRANULOCYTES # BLD AUTO: 0.03 THOUSAND/UL (ref 0–0.2)
IMM GRANULOCYTES NFR BLD AUTO: 0 % (ref 0–2)
LYMPHOCYTES # BLD AUTO: 2.54 THOUSANDS/ÂΜL (ref 0.6–4.47)
LYMPHOCYTES NFR BLD AUTO: 37 % (ref 14–44)
MCH RBC QN AUTO: 29.4 PG (ref 26.8–34.3)
MCHC RBC AUTO-ENTMCNC: 31.1 G/DL (ref 31.4–37.4)
MCV RBC AUTO: 94 FL (ref 82–98)
MONOCYTES # BLD AUTO: 0.45 THOUSAND/ÂΜL (ref 0.17–1.22)
MONOCYTES NFR BLD AUTO: 7 % (ref 4–12)
N GONORRHOEA DNA SPEC QL NAA+PROBE: NEGATIVE
NEUTROPHILS # BLD AUTO: 3.75 THOUSANDS/ÂΜL (ref 1.85–7.62)
NEUTS SEG NFR BLD AUTO: 55 % (ref 43–75)
NRBC BLD AUTO-RTO: 0 /100 WBCS
PLATELET # BLD AUTO: 260 THOUSANDS/UL (ref 149–390)
PMV BLD AUTO: 10.1 FL (ref 8.9–12.7)
POTASSIUM SERPL-SCNC: 3.6 MMOL/L (ref 3.5–5.3)
PROT SERPL-MCNC: 5.1 G/DL (ref 6.4–8.4)
RBC # BLD AUTO: 3.71 MILLION/UL (ref 3.81–5.12)
SODIUM SERPL-SCNC: 138 MMOL/L (ref 135–147)
TREPONEMA PALLIDUM IGG+IGM AB [PRESENCE] IN SERUM OR PLASMA BY IMMUNOASSAY: NORMAL
WBC # BLD AUTO: 6.84 THOUSAND/UL (ref 4.31–10.16)

## 2023-08-15 PROCEDURE — 86780 TREPONEMA PALLIDUM: CPT

## 2023-08-15 PROCEDURE — 80053 COMPREHEN METABOLIC PANEL: CPT

## 2023-08-15 PROCEDURE — 87340 HEPATITIS B SURFACE AG IA: CPT

## 2023-08-15 PROCEDURE — 85025 COMPLETE CBC W/AUTO DIFF WBC: CPT

## 2023-08-15 PROCEDURE — 87806 HIV AG W/HIV1&2 ANTB W/OPTIC: CPT

## 2023-08-15 PROCEDURE — 99232 SBSQ HOSP IP/OBS MODERATE 35: CPT | Performed by: OBSTETRICS & GYNECOLOGY

## 2023-08-15 PROCEDURE — 86803 HEPATITIS C AB TEST: CPT

## 2023-08-15 RX ADMIN — METRONIDAZOLE 500 MG: 500 INJECTION, SOLUTION INTRAVENOUS at 01:53

## 2023-08-15 RX ADMIN — ACETAMINOPHEN 325MG 975 MG: 325 TABLET ORAL at 19:24

## 2023-08-15 RX ADMIN — ACETAMINOPHEN 325MG 975 MG: 325 TABLET ORAL at 05:58

## 2023-08-15 RX ADMIN — IBUPROFEN 600 MG: 600 TABLET, FILM COATED ORAL at 17:34

## 2023-08-15 RX ADMIN — METRONIDAZOLE 500 MG: 500 INJECTION, SOLUTION INTRAVENOUS at 17:27

## 2023-08-15 RX ADMIN — SERTRALINE HYDROCHLORIDE 150 MG: 100 TABLET ORAL at 09:42

## 2023-08-15 RX ADMIN — SODIUM CHLORIDE 150 ML/HR: 0.9 INJECTION, SOLUTION INTRAVENOUS at 10:09

## 2023-08-15 RX ADMIN — METRONIDAZOLE 500 MG: 500 INJECTION, SOLUTION INTRAVENOUS at 10:03

## 2023-08-15 RX ADMIN — OXYCODONE HYDROCHLORIDE 5 MG: 5 TABLET ORAL at 10:11

## 2023-08-15 NOTE — UTILIZATION REVIEW
Initial Clinical Review    OBSERVATION 8/14 CHANGED TO INPATIENT ON 8/15 @ 1305 - TUBO-OVARIAN ABSCESS, STI WORK UP ON IV ANTIBIOTICS. Admission: Date/Time/Statement:   Admission Orders (From admission, onward)     Ordered        08/15/23 1305  Inpatient Admission  Once                      Orders Placed This Encounter   Procedures   • Inpatient Admission     Standing Status:   Standing     Number of Occurrences:   1     Order Specific Question:   Level of Care     Answer:   Med Surg [16]     Order Specific Question:   Estimated length of stay     Answer:   More than 2 Midnights     Order Specific Question:   Certification     Answer:   I certify that inpatient services are medically necessary for this patient for a duration of greater than two midnights. See H&P and MD Progress Notes for additional information about the patient's course of treatment. ED Arrival Information     Expected   -    Arrival   8/14/2023 11:33    Acuity   Urgent            Means of arrival   Walk-In    Escorted by   Self    Service   OB/GYN    Admission type   Emergency            Arrival complaint   abd pain           Chief Complaint   Patient presents with   • Pelvic Pain     Patient c/o right lower pelvic pain that she describes as on her ovary. Patient denies N/V/D. Not taking medication for pain at home. Initial Presentation: 28 y.o. female  [address] who presents with a 5-day history of constant, nonradiating RLQ pain , urinary freq. PMH: kidney stones, ovarian cysts s/p removal.  In the ED she was tachycardic. Labs - moderate blood in urine. Imaging - possible hydrosalpinx and  right adnexal tubo-ovarian abscess on US. Treated with IV fluids, IV Toradol, antibiotics, Flagyl. On exam cervical discharge, tenderness and fullness on R. She is admitted to OBSERVATION status with R tubo-ovarian abscess - afebrile, IV antibiotics, IV Flagyl. General abd pain - PRN analgesia.       Date: 8/15  CHANGED TO INPATIENT STATUS TODAY  Continues on IV antibiotics x 2 and IV Flagyl. On exam has improving intermittent pain, voiding, no N/V, tenderness RLQ.  + mild hypotension. Needs to continue IV antibiotics tx. ED Triage Vitals [08/14/23 1145]   Temperature Pulse Respirations Blood Pressure SpO2   98.2 °F (36.8 °C) (!) 106 18 100/68 98 %      Temp Source Heart Rate Source Patient Position - Orthostatic VS BP Location FiO2 (%)   Oral Monitor Sitting Right arm --      Pain Score       10 - Worst Possible Pain          Wt Readings from Last 1 Encounters:   08/14/23 41 kg (90 lb 6.4 oz)     Additional Vital Signs:   08/15/23 07:23:52 98 °F (36.7 °C) 85 -- 93/56 68 97 % -- --   08/15/23 07:22:40 98 °F (36.7 °C) 84 16 95/56 69 96 % -- --   08/14/23 2113 -- -- -- -- -- 99 % None (Room air) --   08/14/23 20:53:11 98.2 °F (36.8 °C) 65 18 123/72 89 100 % None (Room air) Sitting   08/14/23 1945 -- 66 17 125/72 89 95 % None (Room air) Lying   08/14/23 1943 -- 67 16 125/72 -- 97 % None (Room air) Lying   08/14/23 1733 -- 56 16 114/52 -- 98 % None (Room air) Lying   08/14/23 1515 -- 69 16 102/62 -- 100 % None (Room air) Lying   08/14/23 1318 -- 84 15 117/65 -- 98 % -- --       Pertinent Labs/Diagnostic Test Results:   US pelvis complete w transvaginal   Final Result by Rigo Pritchard MD (08/14 1640)      1. Findings concerning for right adnexal tubo-ovarian abscess. No normal right ovarian tissue is seen. 2.  Normal left ovary. 3.  Normal uterus. CT abdomen pelvis with contrast   ED Interpretation by Yvette Bryan DO (08/14 2496)   Interpreted by me as no free air      Final Result by Jonah Dutton MD (08/14 1414)      1) Two cystic structures measuring 2.3 x 1.9 cm and 2.3 x 1.3 cm, versus a single bilobed cystic structure in the right side of the pelvis, anterior to the right external iliac vessels, and with the larger cystic structure partially protruding into the    anterior abdominal wall.  The appearance is suggestive of cysts within the right ovary, and/or possible hydrosalpinx. Recommend further evaluation with pelvic ultrasound as given right lower quadrant pain and unusual location (if indeed the right ovary),    ovarian torsion is not excluded. 2) Unremarkable CT appearance of the uterus and left ovary. 3) Mild circumferential wall thickening of the urinary bladder, which may indicate cystitis versus underdistention. 4) Normal appendix. No bowel obstruction. Evaluation for bowel inflammation elsewhere somewhat limited by motion artifact, underdistention and lack of oral contrast, however no convincing inflammatory changes. Please note mild inflammation may not be    apparent. 5) Small amount of free fluid in the dependent portion of the pelvis, likely physiologic.          Results from last 7 days   Lab Units 08/15/23  0522 08/14/23  1232   WBC Thousand/uL 6.84 6.97   HEMOGLOBIN g/dL 10.9* 13.2   HEMATOCRIT % 35.0 40.4   PLATELETS Thousands/uL 260 336   NEUTROS ABS Thousands/µL 3.75 4.83         Results from last 7 days   Lab Units 08/15/23  0522 08/14/23  1232   SODIUM mmol/L 138 136   POTASSIUM mmol/L 3.6 4.7   CHLORIDE mmol/L 112* 105   CO2 mmol/L 22 26   ANION GAP mmol/L 4 5   BUN mg/dL 10 10   CREATININE mg/dL 0.47* 0.48*   EGFR ml/min/1.73sq m 129 128   CALCIUM mg/dL 7.7* 9.4     Results from last 7 days   Lab Units 08/15/23  0522 08/14/23  1232   AST U/L 10* 21   ALT U/L 8 13   ALK PHOS U/L 53 71   TOTAL PROTEIN g/dL 5.1* 7.2   ALBUMIN g/dL 3.0* 4.1   TOTAL BILIRUBIN mg/dL 0.27 0.47         Results from last 7 days   Lab Units 08/15/23  0522 08/14/23  1232   GLUCOSE RANDOM mg/dL 118 105     Results from last 7 days   Lab Units 08/14/23  1211   CLARITY UA  Clear   COLOR UA  Yellow   SPEC GRAV UA  1.025   PH UA  7.5   GLUCOSE UA mg/dl Negative   KETONES UA mg/dl Negative   BLOOD UA  Moderate*   PROTEIN UA mg/dl Negative   NITRITE UA  Negative   BILIRUBIN UA  Negative   UROBILINOGEN UA E.U./dl 0.2   LEUKOCYTES UA  Negative   WBC UA /hpf None Seen   RBC UA /hpf 1-2   BACTERIA UA /hpf None Seen   EPITHELIAL CELLS WET PREP /hpf Occasional     ED Treatment:   Medication Administration from 08/14/2023 1133 to 08/14/2023 2046       Date/Time Order Dose Route Action     08/14/2023 1233 EDT sodium chloride 0.9 % bolus 1,000 mL 1,000 mL Intravenous New Bag     08/14/2023 1234 EDT ketorolac (TORADOL) injection 15 mg 15 mg Intravenous Given     08/14/2023 1331 EDT iohexol (OMNIPAQUE) 350 MG/ML injection (SINGLE-DOSE) 85 mL 85 mL Intravenous Given     08/14/2023 2016 EDT sodium chloride 0.9 % infusion 150 mL/hr Intravenous Restarted     08/14/2023 1809 EDT sodium chloride 0.9 % infusion 150 mL/hr Intravenous New Bag     08/14/2023 1922 EDT cefTRIAXone (ROCEPHIN) IVPB (premix in dextrose) 1,000 mg 50 mL 1,000 mg Intravenous New Bag     08/14/2023 2009 EDT doxycycline (VIBRAMYCIN) 100 mg in sodium chloride 0.9 % 100 mL IVPB 100 mg Intravenous New Bag     08/14/2023 1809 EDT metroNIDAZOLE (FLAGYL) IVPB (premix) 500 mg 100 mL 500 mg Intravenous New Bag        History reviewed. No pertinent past medical history. Present on Admission:  • Generalized abdominal pain      Admitting Diagnosis: Right tubo-ovarian abscess [N70.93]  Age/Sex: 28 y.o. female  Admission Orders:  Scheduled Medications:  metroNIDAZOLE, 500 mg, Intravenous, Q8H  sertraline, 150 mg, Oral, Daily      Continuous IV Infusions:  sodium chloride, 150 mL/hr, Intravenous, Continuous      PRN Meds:  acetaminophen, 975 mg, Oral, Q8H PRN  hydrOXYzine HCL, 25 mg, Oral, TID PRN  ibuprofen, 600 mg, Oral, Q6H PRN  nicotine, 1 patch, Transdermal, Daily PRN  oxyCODONE, 5 mg, Oral, Q4H PRN        IP CONSULT TO OB GYN    Network Utilization Review Department  ATTENTION: Please call with any questions or concerns to [phone] and carefully listen to the prompts so that you are directed to the right person.  All voicemails are confidential.  Olivia Amen all requests for admission clinical reviews, approved or denied determinations and any other requests to dedicated fax number below belonging to the campus where the patient is receiving treatment.  List of dedicated fax numbers for the Facilities:  [place] DENIALS (Administrative/Medical Necessity) [phone] [address] (Maternity/NICU/Pediatrics) [phone]   [address] [place] [address] [phone]   [facility] [address] [facility] [phone]   [address] [place] [address] [address] [place] [address] [phone]   [address] [address] [phone]

## 2023-08-15 NOTE — PLAN OF CARE
Problem: PAIN - ADULT  Goal: Verbalizes/displays adequate comfort level or baseline comfort level  Description: Interventions:  - Encourage patient to monitor pain and request assistance  - Assess pain using appropriate pain scale  - Administer analgesics based on type and severity of pain and evaluate response  - Implement non-pharmacological measures as appropriate and evaluate response  - Consider cultural and social influences on pain and pain management  - Notify physician/advanced practitioner if interventions unsuccessful or patient reports new pain  Outcome: Progressing     Problem: INFECTION - ADULT  Goal: Absence or prevention of progression during hospitalization  Description: INTERVENTIONS:  - Assess and monitor for signs and symptoms of infection  - Monitor lab/diagnostic results  - Monitor all insertion sites, i.e. indwelling lines, tubes, and drains  - Monitor endotracheal if appropriate and nasal secretions for changes in amount and color  - [place] appropriate cooling/warming therapies per order  - Administer medications as ordered  - Instruct and encourage patient and family to use good hand hygiene technique  - Identify and instruct in appropriate isolation precautions for identified infection/condition  Outcome: Progressing  Goal: Absence of fever/infection during neutropenic period  Description: INTERVENTIONS:  - Monitor WBC    Outcome: Progressing     Problem: SAFETY ADULT  Goal: Patient will remain free of falls  Description: INTERVENTIONS:  - Educate patient/family on patient safety including physical limitations  - Instruct patient to call for assistance with activity   - Consult OT/PT to assist with strengthening/mobility   - Keep Call bell within reach  - Keep bed low and locked with side rails adjusted as appropriate  - Keep care items and personal belongings within reach  - Initiate and maintain comfort rounds  - Make Fall Risk Sign visible to staff  - Offer Toileting every 2 Hours, in advance of need  - Apply yellow socks and bracelet for high fall risk patients  - Consider moving patient to room near nurses station  Outcome: Progressing  Goal: Maintain or return to baseline ADL function  Description: INTERVENTIONS:  -  Assess patient's ability to carry out ADLs; assess patient's baseline for ADL function and identify physical deficits which impact ability to perform ADLs (bathing, care of mouth/teeth, toileting, grooming, dressing, etc.)  - Assess/evaluate cause of self-care deficits   - Assess range of motion  - Assess patient's mobility; develop plan if impaired  - Assess patient's need for assistive devices and provide as appropriate  - Encourage maximum independence but intervene and supervise when necessary  - Involve family in performance of ADLs  - Assess for home care needs following discharge   - Consider OT consult to assist with ADL evaluation and planning for discharge  - Provide patient education as appropriate  Outcome: Progressing  Goal: Maintains/Returns to pre admission functional level  Description: INTERVENTIONS:  - Perform BMAT or MOVE assessment daily.   - Set and communicate daily mobility goal to care team and patient/family/caregiver. - Collaborate with rehabilitation services on mobility goals if consulted  - Perform Range of Motion 4 times a day. - Reposition patient every 2 hours.   - Dangle patient 3 times a day  - Stand patient 3 times a day  - Ambulate patient 3 times a day  - Out of bed to chair 3 times a day   - Out of bed for meals 3 times a day  - Out of bed for toileting  - Record patient progress and toleration of activity level   Outcome: Progressing

## 2023-08-15 NOTE — PLAN OF CARE
Problem: PAIN - ADULT  Goal: Verbalizes/displays adequate comfort level or baseline comfort level  Description: Interventions:  - Encourage patient to monitor pain and request assistance  - Assess pain using appropriate pain scale  - Administer analgesics based on type and severity of pain and evaluate response  - Implement non-pharmacological measures as appropriate and evaluate response  - Consider cultural and social influences on pain and pain management  - Notify physician/advanced practitioner if interventions unsuccessful or patient reports new pain  Outcome: Progressing     Problem: INFECTION - ADULT  Goal: Absence or prevention of progression during hospitalization  Description: INTERVENTIONS:  - Assess and monitor for signs and symptoms of infection  - Monitor lab/diagnostic results  - Monitor all insertion sites, i.e. indwelling lines, tubes, and drains  - Monitor endotracheal if appropriate and nasal secretions for changes in amount and color  - [place] appropriate cooling/warming therapies per order  - Administer medications as ordered  - Instruct and encourage patient and family to use good hand hygiene technique  - Identify and instruct in appropriate isolation precautions for identified infection/condition  Outcome: Progressing  Goal: Absence of fever/infection during neutropenic period  Description: INTERVENTIONS:  - Monitor WBC    Outcome: Progressing     Problem: SAFETY ADULT  Goal: Patient will remain free of falls  Description: INTERVENTIONS:  - Educate patient/family on patient safety including physical limitations  - Instruct patient to call for assistance with activity   - Consult OT/PT to assist with strengthening/mobility   - Keep Call bell within reach  - Keep bed low and locked with side rails adjusted as appropriate  - Keep care items and personal belongings within reach  - Initiate and maintain comfort rounds  - Make Fall Risk Sign visible to staff  - Offer Toileting every  Hours, in advance of need  - Initiate/Maintain alarm  - Obtain necessary fall risk management equipment:   - Apply yellow socks and bracelet for high fall risk patients  - Consider moving patient to room near nurses station  Outcome: Progressing  Goal: Maintain or return to baseline ADL function  Description: INTERVENTIONS:  -  Assess patient's ability to carry out ADLs; assess patient's baseline for ADL function and identify physical deficits which impact ability to perform ADLs (bathing, care of mouth/teeth, toileting, grooming, dressing, etc.)  - Assess/evaluate cause of self-care deficits   - Assess range of motion  - Assess patient's mobility; develop plan if impaired  - Assess patient's need for assistive devices and provide as appropriate  - Encourage maximum independence but intervene and supervise when necessary  - Involve family in performance of ADLs  - Assess for home care needs following discharge   - Consider OT consult to assist with ADL evaluation and planning for discharge  - Provide patient education as appropriate  Outcome: Progressing  Goal: Maintains/Returns to pre admission functional level  Description: INTERVENTIONS:  - Perform BMAT or MOVE assessment daily.   - Set and communicate daily mobility goal to care team and patient/family/caregiver. - Collaborate with rehabilitation services on mobility goals if consulted  - Perform Range of Motion  times a day. - Reposition patient every  hours.   - Dangle patient  times a day  - Stand patient  times a day  - Ambulate patient  times a day  - Out of bed to chair  times a day   - Out of bed for meals times a day  - Out of bed for toileting  - Record patient progress and toleration of activity level   Outcome: Progressing     Problem: DISCHARGE PLANNING  Goal: Discharge to home or other facility with appropriate resources  Description: INTERVENTIONS:  - Identify barriers to discharge w/patient and caregiver  - Arrange for needed discharge resources and transportation as appropriate  - Identify discharge learning needs (meds, wound care, etc.)  - Arrange for interpretive services to assist at discharge as needed  - Refer to Case Management Department for coordinating discharge planning if the patient needs post-hospital services based on physician/advanced practitioner order or complex needs related to functional status, cognitive ability, or social support system  Outcome: Progressing     Problem: Knowledge Deficit  Goal: Patient/family/caregiver demonstrates understanding of disease process, treatment plan, medications, and discharge instructions  Description: Complete learning assessment and assess knowledge base.   Interventions:  - Provide teaching at level of understanding  - Provide teaching via preferred learning methods  Outcome: Progressing

## 2023-08-15 NOTE — PLAN OF CARE
Problem: PAIN - ADULT  Goal: Verbalizes/displays adequate comfort level or baseline comfort level  Description: Interventions:  - Encourage patient to monitor pain and request assistance  - Assess pain using appropriate pain scale  - Administer analgesics based on type and severity of pain and evaluate response  - Implement non-pharmacological measures as appropriate and evaluate response  - Consider cultural and social influences on pain and pain management  - Notify physician/advanced practitioner if interventions unsuccessful or patient reports new pain  Outcome: Progressing     Problem: INFECTION - ADULT  Goal: Absence or prevention of progression during hospitalization  Description: INTERVENTIONS:  - Assess and monitor for signs and symptoms of infection  - Monitor lab/diagnostic results  - Monitor all insertion sites, i.e. indwelling lines, tubes, and drains  - Monitor endotracheal if appropriate and nasal secretions for changes in amount and color  - [place] appropriate cooling/warming therapies per order  - Administer medications as ordered  - Instruct and encourage patient and family to use good hand hygiene technique  - Identify and instruct in appropriate isolation precautions for identified infection/condition  Outcome: Progressing  Goal: Absence of fever/infection during neutropenic period  Description: INTERVENTIONS:  - Monitor WBC    Outcome: Progressing

## 2023-08-15 NOTE — PROGRESS NOTES
Gynecology Progress note   Madison Springer 28 y.o. female MRN: [id number]  Unit/Bed#: [place] [address] -01 Encounter: [id number]    Assessment/Plan:    29 yo  admitted with a TOA. Clinically improving on antibiotics and oral analgesics. Generalized abdominal pain  Assessment & Plan  Motrin 600mg Q6 /Tylenol 975mg Q8 as needed for mild/moderate pain   Lisa 5mg for breakthrough pain      * Right tubo-ovarian abscess  Assessment & Plan  Ultrasound on 2023 showed: "findings concerning for right adnexal tubo-ovarian abscess. No normal right ovarian tissue seen."  Patient currently afebrile without leukocytosis. Gonorrhea/chlamydia and affirm swab collected  Admit to GYN for observation  Regular Diet  Begin empiric treatment with:  - Doxycycline  mg every 12  - Flagyl  mg twice daily  - Ceftriaxone IV 1 g daily             Subjective:    Bear Back has no current complaints. Pain has improved since beginning treatment. Patient is currently voiding. She is ambulating. She is tolerating PO, and denies nausea or vomitting. Patient denies fever, chills, chest pain, shortness of breath, or calf tenderness. Objective:  /72 (BP Location: Right arm)   Pulse 65   Temp 98.2 °F (36.8 °C) (Oral)   Resp 18   Ht 4' 11" (1.499 m)   Wt 41 kg (90 lb 6.4 oz)   LMP 2023 (Exact Date)   SpO2 99%   BMI 18.26 kg/m²     I/O last 3 completed shifts: In: 1000 [IV Piggyback:1000]  Out: -   I/O this shift:  In: 150 [IV Piggyback:150]  Out: -     Lab Results   Component Value Date    WBC 6.84 08/15/2023    HGB 10.9 (L) 08/15/2023    HCT 35.0 08/15/2023    MCV 94 08/15/2023     08/15/2023       Lab Results   Component Value Date    CALCIUM 7.7 (L) 08/15/2023    K 3.6 08/15/2023    CO2 22 08/15/2023     (H) 08/15/2023    BUN 10 08/15/2023    CREATININE 0.47 (L) 08/15/2023           Physical Exam  Constitutional:       General: She is not in acute distress. Appearance: Normal appearance. Genitourinary:      Genitourinary Comments: Pelvic exam deferred   HENT:      Head: Normocephalic and atraumatic. Eyes:      Extraocular Movements: Extraocular movements intact. Cardiovascular:      Rate and Rhythm: Normal rate. Pulses: Normal pulses. Pulmonary:      Effort: Pulmonary effort is normal. No respiratory distress. Abdominal:      General: There is no distension. Palpations: Abdomen is soft. Tenderness: There is abdominal tenderness (RLQ worse). There is no guarding. Musculoskeletal:         General: Normal range of motion. Cervical back: Normal range of motion. Neurological:      General: No focal deficit present. Mental Status: She is alert. Mental status is at baseline. Skin:     General: Skin is warm and dry. Psychiatric:         Mood and Affect: Mood normal.         Behavior: Behavior normal.   Vitals reviewed.            Brittanie Rodriguez MD  8/15/2023  6:40 AM

## 2023-08-15 NOTE — PHYSICIAN ADVISOR
Current patient class: Observation  The patient is currently on Hospital Day: 2 at [address]          After review of the relevant documentation, labs, vital signs and test results, the patient is appropriate for INPATIENT ADMISSION. Millicent Encarnacion Rationale is as follows: The patient is a 28 yrs Female who presented to the ED at 8/14/2023 11:45 AM with a chief complaint of Pelvic Pain (Patient c/o right lower pelvic pain that she describes as on her ovary. Patient denies N/V/D. Not taking medication for pain at home. ) Pelvis US concerning for tubo-ovarian abscess. Work up for STI is pending. She remains on IV antibiotics. IP seems appropriate. The patient’s vitals on arrival were   ED Triage Vitals [08/14/23 1145]   Temperature Pulse Respirations Blood Pressure SpO2   98.2 °F (36.8 °C) (!) 106 18 100/68 98 %      Temp Source Heart Rate Source Patient Position - Orthostatic VS BP Location FiO2 (%)   Oral Monitor Sitting Right arm --      Pain Score       10 - Worst Possible Pain           History reviewed. No pertinent past medical history.   Past Surgical History:   Procedure Laterality Date   • BLADDER REPAIR     • KIDNEY STONE SURGERY     • OVARIAN CYST REMOVAL Right        The patient was admitted to the hospital at N/A on N/A for the following diagnosis:  Right tubo-ovarian abscess [N70.93]    Consults have been placed to:   IP CONSULT TO OB GYN    Vitals:    08/14/23 2053 08/14/23 2113 08/15/23 0722 08/15/23 0723   BP: 123/72  95/56 93/56   BP Location: Right arm      Pulse: 65  84 85   Resp: 18  16    Temp: 98.2 °F (36.8 °C)  98 °F (36.7 °C) 98 °F (36.7 °C)   TempSrc: Oral      SpO2: 100% 99% 96% 97%   Weight: 41 kg (90 lb 6.4 oz)      Height: 4' 11" (1.499 m)          Most recent labs:    Recent Labs     08/15/23  0522   WBC 6.84   HGB 10.9*   HCT 35.0      K 3.6   CALCIUM 7.7*   BUN 10   CREATININE 0.47*   AST 10*   ALT 8   ALKPHOS 53       Scheduled Meds:  Current Facility-Administered Medications   Medication Dose Route Frequency Provider Last Rate   • acetaminophen  975 mg Oral Q8H PRN Reyna Ashford MD     • hydrOXYzine HCL  25 mg Oral TID PRN Reyna Ashford MD     • ibuprofen  600 mg Oral Q6H PRN Reyna Ashford MD     • metroNIDAZOLE  500 mg Intravenous Alan Osei  mg (08/15/23 1003)   • nicotine  1 patch Transdermal Daily PRN Reyna Ashford MD     • oxyCODONE  5 mg Oral Q4H PRN Reyna Ashford MD     • sertraline  150 mg Oral Daily Reyna Ashford MD     • sodium chloride  150 mL/hr Intravenous Continuous Reyna Ashford  mL/hr (08/15/23 1009)     Continuous Infusions:sodium chloride, 150 mL/hr, Last Rate: 150 mL/hr (08/15/23 1009)      PRN Meds:.•  acetaminophen  •  hydrOXYzine HCL  •  ibuprofen  •  nicotine  •  oxyCODONE    Surgical procedures (if appropriate):

## 2023-08-16 VITALS
HEIGHT: 59 IN | HEART RATE: 66 BPM | BODY MASS INDEX: 18.22 KG/M2 | SYSTOLIC BLOOD PRESSURE: 115 MMHG | OXYGEN SATURATION: 96 % | WEIGHT: 90.4 LBS | TEMPERATURE: 97.9 F | RESPIRATION RATE: 15 BRPM | DIASTOLIC BLOOD PRESSURE: 65 MMHG

## 2023-08-16 LAB
ALBUMIN SERPL BCP-MCNC: 3.2 G/DL (ref 3.5–5)
ALP SERPL-CCNC: 53 U/L (ref 34–104)
ALT SERPL W P-5'-P-CCNC: 13 U/L (ref 7–52)
ANION GAP SERPL CALCULATED.3IONS-SCNC: 3 MMOL/L
AST SERPL W P-5'-P-CCNC: 12 U/L (ref 13–39)
BASOPHILS # BLD AUTO: 0.03 THOUSANDS/ÂΜL (ref 0–0.1)
BASOPHILS NFR BLD AUTO: 0 % (ref 0–1)
BILIRUB SERPL-MCNC: 0.46 MG/DL (ref 0.2–1)
BUN SERPL-MCNC: 7 MG/DL (ref 5–25)
CALCIUM ALBUM COR SERPL-MCNC: 8.4 MG/DL (ref 8.3–10.1)
CALCIUM SERPL-MCNC: 7.8 MG/DL (ref 8.4–10.2)
CANDIDA RRNA VAG QL PROBE: NEGATIVE
CHLORIDE SERPL-SCNC: 110 MMOL/L (ref 96–108)
CO2 SERPL-SCNC: 24 MMOL/L (ref 21–32)
CREAT SERPL-MCNC: 0.45 MG/DL (ref 0.6–1.3)
EOSINOPHIL # BLD AUTO: 0.05 THOUSAND/ÂΜL (ref 0–0.61)
EOSINOPHIL NFR BLD AUTO: 1 % (ref 0–6)
ERYTHROCYTE [DISTWIDTH] IN BLOOD BY AUTOMATED COUNT: 12.7 % (ref 11.6–15.1)
G VAGINALIS RRNA GENITAL QL PROBE: POSITIVE
GFR SERPL CREATININE-BSD FRML MDRD: 131 ML/MIN/1.73SQ M
GLUCOSE SERPL-MCNC: 80 MG/DL (ref 65–140)
HCT VFR BLD AUTO: 33.4 % (ref 34.8–46.1)
HGB BLD-MCNC: 10.5 G/DL (ref 11.5–15.4)
IMM GRANULOCYTES # BLD AUTO: 0.02 THOUSAND/UL (ref 0–0.2)
IMM GRANULOCYTES NFR BLD AUTO: 0 % (ref 0–2)
LYMPHOCYTES # BLD AUTO: 2.11 THOUSANDS/ÂΜL (ref 0.6–4.47)
LYMPHOCYTES NFR BLD AUTO: 29 % (ref 14–44)
MCH RBC QN AUTO: 29.3 PG (ref 26.8–34.3)
MCHC RBC AUTO-ENTMCNC: 31.4 G/DL (ref 31.4–37.4)
MCV RBC AUTO: 93 FL (ref 82–98)
MONOCYTES # BLD AUTO: 0.41 THOUSAND/ÂΜL (ref 0.17–1.22)
MONOCYTES NFR BLD AUTO: 6 % (ref 4–12)
NEUTROPHILS # BLD AUTO: 4.64 THOUSANDS/ÂΜL (ref 1.85–7.62)
NEUTS SEG NFR BLD AUTO: 64 % (ref 43–75)
NRBC BLD AUTO-RTO: 0 /100 WBCS
PLATELET # BLD AUTO: 256 THOUSANDS/UL (ref 149–390)
PMV BLD AUTO: 9.9 FL (ref 8.9–12.7)
POTASSIUM SERPL-SCNC: 3.6 MMOL/L (ref 3.5–5.3)
PROT SERPL-MCNC: 5.4 G/DL (ref 6.4–8.4)
RBC # BLD AUTO: 3.58 MILLION/UL (ref 3.81–5.12)
SODIUM SERPL-SCNC: 137 MMOL/L (ref 135–147)
T VAGINALIS RRNA GENITAL QL PROBE: NEGATIVE
WBC # BLD AUTO: 7.26 THOUSAND/UL (ref 4.31–10.16)

## 2023-08-16 PROCEDURE — NC001 PR NO CHARGE: Performed by: OBSTETRICS & GYNECOLOGY

## 2023-08-16 PROCEDURE — 99238 HOSP IP/OBS DSCHRG MGMT 30/<: CPT | Performed by: OBSTETRICS & GYNECOLOGY

## 2023-08-16 PROCEDURE — 80053 COMPREHEN METABOLIC PANEL: CPT

## 2023-08-16 PROCEDURE — 85025 COMPLETE CBC W/AUTO DIFF WBC: CPT

## 2023-08-16 RX ORDER — ONDANSETRON 4 MG/1
4 TABLET, ORALLY DISINTEGRATING ORAL EVERY 6 HOURS PRN
Qty: 20 TABLET | Refills: 0 | Status: SHIPPED | OUTPATIENT
Start: 2023-08-16 | End: 2023-08-30 | Stop reason: ALTCHOICE

## 2023-08-16 RX ORDER — DOXYCYCLINE HYCLATE 100 MG/1
100 CAPSULE ORAL EVERY 12 HOURS SCHEDULED
Status: DISCONTINUED | OUTPATIENT
Start: 2023-08-16 | End: 2023-08-16 | Stop reason: HOSPADM

## 2023-08-16 RX ORDER — METRONIDAZOLE 500 MG/1
500 TABLET ORAL EVERY 8 HOURS SCHEDULED
Status: DISCONTINUED | OUTPATIENT
Start: 2023-08-16 | End: 2023-08-16 | Stop reason: HOSPADM

## 2023-08-16 RX ORDER — METRONIDAZOLE 500 MG/1
500 TABLET ORAL EVERY 8 HOURS SCHEDULED
Qty: 37 TABLET | Refills: 0 | Status: SHIPPED | OUTPATIENT
Start: 2023-08-16 | End: 2023-08-29

## 2023-08-16 RX ORDER — ONDANSETRON 4 MG/1
4 TABLET, ORALLY DISINTEGRATING ORAL EVERY 6 HOURS PRN
Status: DISCONTINUED | OUTPATIENT
Start: 2023-08-16 | End: 2023-08-16 | Stop reason: HOSPADM

## 2023-08-16 RX ORDER — DOXYCYCLINE HYCLATE 100 MG/1
100 CAPSULE ORAL EVERY 12 HOURS SCHEDULED
Qty: 25 CAPSULE | Refills: 0 | Status: SHIPPED | OUTPATIENT
Start: 2023-08-16 | End: 2023-08-29

## 2023-08-16 RX ADMIN — SODIUM CHLORIDE 150 ML/HR: 0.9 INJECTION, SOLUTION INTRAVENOUS at 04:16

## 2023-08-16 RX ADMIN — SERTRALINE HYDROCHLORIDE 150 MG: 100 TABLET ORAL at 09:15

## 2023-08-16 RX ADMIN — METRONIDAZOLE 500 MG: 500 INJECTION, SOLUTION INTRAVENOUS at 01:07

## 2023-08-16 RX ADMIN — METRONIDAZOLE 500 MG: 500 TABLET ORAL at 09:15

## 2023-08-16 RX ADMIN — DOXYCYCLINE 100 MG: 100 CAPSULE ORAL at 09:15

## 2023-08-16 RX ADMIN — METRONIDAZOLE 500 MG: 500 TABLET ORAL at 13:53

## 2023-08-16 RX ADMIN — SODIUM CHLORIDE 150 ML/HR: 0.9 INJECTION, SOLUTION INTRAVENOUS at 10:58

## 2023-08-16 NOTE — UTILIZATION REVIEW
Continued Stay Review    Date: 8-16-23                        Current Patient Class:  Inpatient  Current Level of Care: med surg    HPI:33 y.o. female initially admitted on 8- 14-23     Assessment/Plan:     Creatinine 0.45, Wbc 10.5. Iv antibiotic changed to po flagyl and po doxycycline. Last dose of iv flagyl 0107. Continue iv .9% ns 150/hr. Pain improved.      Vital Signs:   Patient Vitals for the past 24 hrs:   BP Temp Pulse Resp SpO2   08/16/23 0753 115/62 98.3 °F (36.8 °C) 66 15 96 %   08/15/23 2238 114/60 98.1 °F (36.7 °C) 73 20 99 %   08/15/23 1953 -- -- -- -- 100 %   08/15/23 1542 116/59 97.6 °F (36.4 °C) 61 18 99 %       Pertinent Labs/Diagnostic Results:       Results from last 7 days   Lab Units 08/16/23  0553 08/15/23  0522 08/14/23  1232   WBC Thousand/uL 7.26 6.84 6.97   HEMOGLOBIN g/dL 10.5* 10.9* 13.2   HEMATOCRIT % 33.4* 35.0 40.4   PLATELETS Thousands/uL 256 260 336   NEUTROS ABS Thousands/µL 4.64 3.75 4.83         Results from last 7 days   Lab Units 08/16/23  0553 08/15/23  0522 08/14/23  1232   SODIUM mmol/L 137 138 136   POTASSIUM mmol/L 3.6 3.6 4.7   CHLORIDE mmol/L 110* 112* 105   CO2 mmol/L 24 22 26   ANION GAP mmol/L 3 4 5   BUN mg/dL 7 10 10   CREATININE mg/dL 0.45* 0.47* 0.48*   EGFR ml/min/1.73sq m 131 129 128   CALCIUM mg/dL 7.8* 7.7* 9.4     Results from last 7 days   Lab Units 08/16/23  0553 08/15/23  0522 08/14/23  1232   AST U/L 12* 10* 21   ALT U/L 13 8 13   ALK PHOS U/L 53 53 71   TOTAL PROTEIN g/dL 5.4* 5.1* 7.2   ALBUMIN g/dL 3.2* 3.0* 4.1   TOTAL BILIRUBIN mg/dL 0.46 0.27 0.47         Results from last 7 days   Lab Units 08/16/23  0553 08/15/23  0522 08/14/23  1232   GLUCOSE RANDOM mg/dL 80 118 105       Results from last 7 days   Lab Units 08/15/23  0902   HEP B S AG  Non-reactive   HEP C AB  Non-reactive         Results from last 7 days   Lab Units 08/14/23  1211   CLARITY UA  Clear   COLOR UA  Yellow   SPEC GRAV UA  1.025   PH UA  7.5   GLUCOSE UA mg/dl Negative KETONES UA mg/dl Negative   BLOOD UA  Moderate*   PROTEIN UA mg/dl Negative   NITRITE UA  Negative   BILIRUBIN UA  Negative   UROBILINOGEN UA E.U./dl 0.2   LEUKOCYTES UA  Negative   WBC UA /hpf None Seen   RBC UA /hpf 1-2   BACTERIA UA /hpf None Seen   EPITHELIAL CELLS WET PREP /hpf Occasional       Medications:   Scheduled Medications:  doxycycline hyclate, 100 mg, Oral, Q12H LEONARDO  metroNIDAZOLE, 500 mg, Oral, Q8H LEONARDO  sertraline, 150 mg, Oral, Daily      Continuous IV Infusions:  sodium chloride, 150 mL/hr, Intravenous, Continuous      PRN Meds:  acetaminophen, 975 mg, Oral, Q8H PRN  hydrOXYzine HCL, 25 mg, Oral, TID PRN  ibuprofen, 600 mg, Oral, Q6H PRN  nicotine, 1 patch, Transdermal, Daily PRN  oxyCODONE, 5 mg, Oral, Q4H PRN        Discharge Plan: to be determined     Network Utilization Review Department  ATTENTION: Please call with any questions or concerns to [phone] and carefully listen to the prompts so that you are directed to the right person. All voicemails are confidential.  Tori April all requests for admission clinical reviews, approved or denied determinations and any other requests to dedicated fax number below belonging to the campus where the patient is receiving treatment.  List of dedicated fax numbers for the Facilities:  [place] DENIALS (Administrative/Medical Necessity) [phone] [address] [facility] (Maternity/NICU/Pediatrics) [phone]   [address] [place] [address] Drive [phone]   [facility] [phone]   [address] [phone]   1505 [facility] 207 [facility] [address] [place]  [facility] [phone]

## 2023-08-16 NOTE — DISCHARGE SUMMARY
Discharge Summary - Gynecology  Neto Springer 28 y.o. female MRN: [id number]  Unit/Bed#: [place] [address] -01 Encounter: [id number]    Admission Date: 8/14/2023   Discharge Date: 08/16/23    Attending Physician:   Yokasta Lopez MD     Diagnosis:   Abdominal Pain   Right Tuboovarian Abscess     Imaging Performed:   CT AP 8/14/23  1) Two cystic structures measuring 2.3 x 1.9 cm and 2.3 x 1.3 cm, versus a single bilobed cystic structure in the right side of the pelvis, anterior to the right external iliac vessels, and with the larger cystic structure partially protruding into the   anterior abdominal wall. The appearance is suggestive of cysts within the right ovary, and/or possible hydrosalpinx. Recommend further evaluation with pelvic ultrasound as given right lower quadrant pain and unusual location (if indeed the right ovary),   ovarian torsion is not excluded    TVUS 8/14/23  1. Findings concerning for right adnexal tubo-ovarian abscess. No normal right ovarian tissue is seen. HPI:  Destini Reeves is a 28 y.o. Anupama Lente who presented with a 5-day history of right lower quadrant pain. On CTAP 2 cystic structures were visualized in the right pelvis which was read as a possible hydrosalpinx but with recommendation for ultrasound to rule out TOA. Ultrasound was performed and showed "findings concerning for right adnexal tubo-ovarian abscess. No normal right ovarian tissue seen."     She was admitted for IV antibiotics. She received 24 hours of IV rocephin, flagyl and doxycyline. Her pain was improving and she was transitioned to PO antibiotics. She was then discharged home with instructions to follow up outpatient.      Lab Results:   Lab Results   Component Value Date    WBC 7.26 08/16/2023    HGB 10.5 (L) 08/16/2023    HCT 33.4 (L) 08/16/2023    MCV 93 08/16/2023     08/16/2023     Lab Results   Component Value Date    CALCIUM 7.8 (L) 08/16/2023    K 3.6 08/16/2023    CO2 24 08/16/2023     (H) 08/16/2023    BUN 7 08/16/2023    CREATININE 0.45 (L) 08/16/2023     No results found for: "POCGLU"  No results found for: "PTT"  No results found for: "INR", "PROTIME"    Complications:   None     Condition at Discharge:   good     Discharge Medications:   See after visit summary for reconciled discharge medications provided to patient and family. Discharge instructions: See after visit summary for complete information. Do not place anything (no partner, tampons or douche) in your vagina for 6 weeks. You may walk for exercise for the first 6 weeks then gradually return to your usual activities.    You may take stairs one at a time, touching each step with both feet for the first few days, then as tolerated. Please do not drive until tolerating pain and off of narcotics.    Please look at your incision in the mirror daily and call for redness or tenderness or increased warmth.  Call us for increasing redness or steady drainage from the incision.    Please call us for temperature > 100.4*F or 38* C, worsening pain or a foul discharge. No heavy lifting more than one full gallon of milk (about 8 lbs). No tub baths or swimming. Provisions for Follow-Up Care:   See after visit summary for information related to follow-up care and any pertinent home health orders. Disposition: See After Visit Summary for discharge disposition information.   Planned Readmission: No    Code Status: Full Code       Karyna Duarte MD  8/17/2023  6:29 AM

## 2023-08-16 NOTE — PROGRESS NOTES
Gyn Progress Note   Neto Springer 28 y.o. female MRN: [id number]  Unit/Bed#: [place] [address] -01 Encounter: [id number]      A/P: 29 yo  admitted with a TOA. Now s/p 24 hours of IV antibiotics, improving. Generalized abdominal pain  Assessment & Plan  Motrin 600mg Q6 /Tylenol 975mg Q8 as needed for mild/moderate pain   Lisa 5mg for breakthrough pain      * Right tubo-ovarian abscess  Assessment & Plan  Ultrasound on 2023 showed: "findings concerning for right adnexal tubo-ovarian abscess. No normal right ovarian tissue seen."  Continues to remain afebrile   Gonorrhea/chlamydia negative   Affirm probe pending   Rapid HIV/RPR/Hep B/Hep C all negative   Regular Diet  Now s/p 24 hours of IV Doxycycline, flagyl and Ceftriaxone    Consider transition to PO antibiotics today           Dispo:     Subjective:   Patient has no complaints. Blanca Lopez reports that she feels well this AM. She states that her pain has improved. She reports that she did vomit last night but she believes that this was related to the antibiotic she was receiving. She denies any nausea or vomiting since then. She has been able to tolerate PO. She denies any fevers or chills. /60   Pulse 73   Temp 98.1 °F (36.7 °C)   Resp 20   Ht 4' 11" (1.499 m)   Wt 41 kg (90 lb 6.4 oz)   LMP 2023 (Exact Date)   SpO2 99%   BMI 18.26 kg/m²     Lab Results   Component Value Date    WBC 6.84 08/15/2023    HGB 10.9 (L) 08/15/2023    HCT 35.0 08/15/2023    MCV 94 08/15/2023     08/15/2023       Lab Results   Component Value Date    CALCIUM 7.7 (L) 08/15/2023    K 3.6 08/15/2023    CO2 22 08/15/2023     (H) 08/15/2023    BUN 10 08/15/2023    CREATININE 0.47 (L) 08/15/2023       No results found for: "POCGLU"    I/O last 3 completed shifts: In: 1150 [IV Piggyback:1150]  Out: -   I/O this shift:  In: 3000 [I.V.:3000]  Out: -     Physical Exam  Constitutional:       General: She is not in acute distress. Appearance: Normal appearance. HENT:      Head: Normocephalic and atraumatic. Cardiovascular:      Rate and Rhythm: Normal rate. Pulmonary:      Effort: Pulmonary effort is normal.   Abdominal:      Palpations: Abdomen is soft. Tenderness: There is abdominal tenderness in the right lower quadrant. There is no guarding or rebound. Comments: Reports very mild tenderness in RLQ   Neurological:      General: No focal deficit present. Mental Status: She is alert. Skin:     General: Skin is warm and dry.    Psychiatric:         Mood and Affect: Mood normal.         Michael Manzanares MD  Obstetrics & Gynecology PGY-3  8/16/2023  6:21 AM

## 2023-08-16 NOTE — UTILIZATION REVIEW
Notification of Unplanned, Urgent, or   Emergency Inpatient Admission   [address] [facility], 1515 [facility]  Tax ID: [id number]  NPI: [id number]  [facility]   Admission Level of Care: Inpatient  Place of Service Code: 24     Attending Physician Information  Attending Name and NPI#Nancy Cherry, [address] [facility] [address] [facility] [address] [[id number]]  Phone: [phone]     Admission Information  Inpatient Admission Date/Time: 8/15/23  1:05 PM  Discharge Date/Time: No discharge date for patient encounter. Admitting Diagnosis Code/Description:  Right tubo-ovarian abscess [N70.93]     Utilization Review Contact  [facility] Utilization   Phone: [phone]  Fax: [phone]  Email: [email or web address]  Contact for approvals/pending authorizations, clinical reviews, and discharge. Physician Advisory Services Contact  Medical Necessity Denial & Fwde-hv-Shnd Discussion  Phone: [phone]  Fax: [phone]  Email: [email or web address]

## 2023-08-17 ENCOUNTER — TELEPHONE (OUTPATIENT)
Dept: FAMILY MEDICINE CLINIC | Facility: CLINIC | Age: 33
End: 2023-08-17

## 2023-08-17 ENCOUNTER — TRANSITIONAL CARE MANAGEMENT (OUTPATIENT)
Dept: FAMILY MEDICINE CLINIC | Facility: CLINIC | Age: 33
End: 2023-08-17

## 2023-08-17 NOTE — PLAN OF CARE
Problem: PAIN - ADULT  Goal: Verbalizes/displays adequate comfort level or baseline comfort level  Description: Interventions:  - Encourage patient to monitor pain and request assistance  - Assess pain using appropriate pain scale  - Administer analgesics based on type and severity of pain and evaluate response  - Implement non-pharmacological measures as appropriate and evaluate response  - Consider cultural and social influences on pain and pain management  - Notify physician/advanced practitioner if interventions unsuccessful or patient reports new pain  Outcome: Adequate for Discharge     Problem: INFECTION - ADULT  Goal: Absence or prevention of progression during hospitalization  Description: INTERVENTIONS:  - Assess and monitor for signs and symptoms of infection  - Monitor lab/diagnostic results  - Monitor all insertion sites, i.e. indwelling lines, tubes, and drains  - Monitor endotracheal if appropriate and nasal secretions for changes in amount and color  - [place] appropriate cooling/warming therapies per order  - Administer medications as ordered  - Instruct and encourage patient and family to use good hand hygiene technique  - Identify and instruct in appropriate isolation precautions for identified infection/condition  Outcome: Adequate for Discharge  Goal: Absence of fever/infection during neutropenic period  Description: INTERVENTIONS:  - Monitor WBC    Outcome: Adequate for Discharge     Problem: SAFETY ADULT  Goal: Patient will remain free of falls  Description: INTERVENTIONS:  - Educate patient/family on patient safety including physical limitations  - Instruct patient to call for assistance with activity   - Consult OT/PT to assist with strengthening/mobility   - Keep Call bell within reach  - Keep bed low and locked with side rails adjusted as appropriate  - Keep care items and personal belongings within reach  - Initiate and maintain comfort rounds  - Apply yellow socks and bracelet for high fall risk patients  - Consider moving patient to room near nurses station  Outcome: Adequate for Discharge  Goal: Maintain or return to baseline ADL function  Description: INTERVENTIONS:  -  Assess patient's ability to carry out ADLs; assess patient's baseline for ADL function and identify physical deficits which impact ability to perform ADLs (bathing, care of mouth/teeth, toileting, grooming, dressing, etc.)  - Assess/evaluate cause of self-care deficits   - Assess range of motion  - Assess patient's mobility; develop plan if impaired  - Assess patient's need for assistive devices and provide as appropriate  - Encourage maximum independence but intervene and supervise when necessary  - Involve family in performance of ADLs  - Assess for home care needs following discharge   - Consider OT consult to assist with ADL evaluation and planning for discharge  - Provide patient education as appropriate  Outcome: Adequate for Discharge  Goal: Maintains/Returns to pre admission functional level  Description: INTERVENTIONS:  - Perform BMAT or MOVE assessment daily.   - Set and communicate daily mobility goal to care team and patient/family/caregiver.    - Collaborate with rehabilitation services on mobility goals if consulted  - Out of bed for toileting  - Record patient progress and toleration of activity level   Outcome: Adequate for Discharge     Problem: DISCHARGE PLANNING  Goal: Discharge to home or other facility with appropriate resources  Description: INTERVENTIONS:  - Identify barriers to discharge w/patient and caregiver  - Arrange for needed discharge resources and transportation as appropriate  - Identify discharge learning needs (meds, wound care, etc.)  - Arrange for interpretive services to assist at discharge as needed  - Refer to Case Management Department for coordinating discharge planning if the patient needs post-hospital services based on physician/advanced practitioner order or complex needs related to functional status, cognitive ability, or social support system  Outcome: Adequate for Discharge     Problem: Knowledge Deficit  Goal: Patient/family/caregiver demonstrates understanding of disease process, treatment plan, medications, and discharge instructions  Description: Complete learning assessment and assess knowledge base.   Interventions:  - Provide teaching at level of understanding  - Provide teaching via preferred learning methods  Outcome: Adequate for Discharge

## 2023-08-17 NOTE — UTILIZATION REVIEW
NOTIFICATION OF ADMISSION DISCHARGE   This is a Notification of Discharge from [address] [place] [address]. Please be advised that this patient has been discharge from our facility. Below you will find the admission and discharge date and time including the patient’s disposition. UTILIZATION REVIEW CONTACT:  Igor Gonsalez  Utilization   Network Utilization Review Department  Phone: [phone] x carefully listen to the prompts. All voicemails are confidential.  Email: [email or web address]     ADMISSION INFORMATION  PRESENTATION DATE: 8/14/2023 11:45 AM  OBERVATION ADMISSION DATE:   INPATIENT ADMISSION DATE: 8/15/23  1:05 PM   DISCHARGE DATE: 8/16/2023  6:30 PM   DISPOSITION:Home/Self Care    IMPORTANT INFORMATION:  Send all requests for admission clinical reviews, approved or denied determinations and any other requests to dedicated fax number below belonging to the campus where the patient is receiving treatment.  List of dedicated fax numbers:  [place] DENIALS (Administrative/Medical Necessity) [phone] 2303 [facility] (Maternity/NICU/Pediatrics) [phone]   [facility] [phone]   [facility] [phone] 1636 [facility] [phone]   [address] [place] [address] [phone]   [facility] [phone]   [address] [place] [address] 608 [facility] [phone]   [address] [place] [address] [phone] 3441 [facility] [phone] 2720 [facility] [address] [facility] [phone]

## 2023-08-17 NOTE — UTILIZATION REVIEW
NOTIFICATION OF ADMISSION DISCHARGE   This is a Notification of Discharge from [facility]. Please be advised that this patient has been discharge from our facility. Below you will find the admission and discharge date and time including the patient’s disposition. UTILIZATION REVIEW CONTACT:  Fidel Goff  Utilization   Network Utilization Review Department  Phone: [phone] x carefully listen to the prompts. All voicemails are confidential.  Email: [email or web address]     ADMISSION INFORMATION  PRESENTATION DATE: 8/14/2023 11:45 AM  OBERVATION ADMISSION DATE:   INPATIENT ADMISSION DATE: 8/15/23  1:05 PM   DISCHARGE DATE: 8/16/2023  6:30 PM   DISPOSITION:Home/Self Care    IMPORTANT INFORMATION:  Send all requests for admission clinical reviews, approved or denied determinations and any other requests to dedicated fax number below belonging to the campus where the patient is receiving treatment.  List of dedicated fax numbers:  [place] DENIALS (Administrative/Medical Necessity) [phone] 2303 [facility] (Maternity/NICU/Pediatrics) [phone]   [facility] [phone]   [facility] [phone] 1636 [facility] [phone]   [address] [place] [address] [phone]   [facility] [phone]   [address] [place] [address] 608 [facility] [phone] 506 [facility] [phone]   [facility] [phone]   2720 [facility] [address] [facility] [phone]

## 2023-08-17 NOTE — NURSING NOTE
Reviewed discharge instructions with patient via [employer] , [facility] Neri Vaca Luis Alfredo. Pt verbalized understanding. No questions.

## 2023-08-17 NOTE — TELEPHONE ENCOUNTER
Please schedule TCM appointment. Verify how patient is feeling and if they are in need of anything before their visit. Please send appt date and patient questions/concerns to Leon to complete TCM.

## 2023-08-24 ENCOUNTER — OFFICE VISIT (OUTPATIENT)
Dept: FAMILY MEDICINE CLINIC | Facility: CLINIC | Age: 33
End: 2023-08-24

## 2023-08-24 VITALS
OXYGEN SATURATION: 99 % | BODY MASS INDEX: 18.14 KG/M2 | WEIGHT: 90 LBS | DIASTOLIC BLOOD PRESSURE: 70 MMHG | HEIGHT: 59 IN | RESPIRATION RATE: 16 BRPM | HEART RATE: 100 BPM | SYSTOLIC BLOOD PRESSURE: 102 MMHG | TEMPERATURE: 98.6 F

## 2023-08-24 DIAGNOSIS — N70.93 RIGHT TUBO-OVARIAN ABSCESS: Primary | ICD-10-CM

## 2023-08-24 DIAGNOSIS — F32.A ANXIETY AND DEPRESSION: ICD-10-CM

## 2023-08-24 DIAGNOSIS — F41.9 ANXIETY AND DEPRESSION: ICD-10-CM

## 2023-08-24 PROBLEM — Z80.0 FAMILY HISTORY OF COLON CANCER: Status: RESOLVED | Noted: 2021-12-02 | Resolved: 2023-08-24

## 2023-08-24 PROCEDURE — 99495 TRANSJ CARE MGMT MOD F2F 14D: CPT

## 2023-08-24 RX ORDER — MIRTAZAPINE 30 MG/1
TABLET, FILM COATED ORAL
COMMUNITY
Start: 2023-08-11

## 2023-08-24 NOTE — ASSESSMENT & PLAN NOTE
Doing well. PHQ-2/9 Depression Screening    Little interest or pleasure in doing things: 3 - nearly every day  Feeling down, depressed, or hopeless: 1 - several days  Trouble falling or staying asleep, or sleeping too much: 0 - not at all  Feeling tired or having little energy: 0 - not at all  Poor appetite or overeatin - not at all  Feeling bad about yourself - or that you are a failure or have let yourself or your family down: 0 - not at all  Trouble concentrating on things, such as reading the newspaper or watching television: 1 - several days  Moving or speaking so slowly that other people could have noticed.  Or the opposite - being so fidgety or restless that you have been moving around a lot more than usual: 0 - not at all  Thoughts that you would be better off dead, or of hurting yourself in some way: 0 - not at all  PHQ-9 Score: 5   PHQ-9 Interpretation: Mild depression        - Continue zoloft 150 mg daily, remeron, atarax prn, & weekly therapy sessions

## 2023-08-24 NOTE — PROGRESS NOTES
Name: Todd Terry      : 1990      MRN: [id number]  Encounter Provider: KASSANDRA Ward  Encounter Date: 2023   Encounter department: [address] [facility],[address]     1. Right tubo-ovarian abscess  Assessment & Plan:  - Complete doxy/flagyl course, follow-up obgyn as scheduled on 23      2. Anxiety and depression  Assessment & Plan:  Doing well. PHQ-2/9 Depression Screening    Little interest or pleasure in doing things: 3 - nearly every day  Feeling down, depressed, or hopeless: 1 - several days  Trouble falling or staying asleep, or sleeping too much: 0 - not at all  Feeling tired or having little energy: 0 - not at all  Poor appetite or overeatin - not at all  Feeling bad about yourself - or that you are a failure or have let yourself or your family down: 0 - not at all  Trouble concentrating on things, such as reading the newspaper or watching television: 1 - several days  Moving or speaking so slowly that other people could have noticed. Or the opposite - being so fidgety or restless that you have been moving around a lot more than usual: 0 - not at all  Thoughts that you would be better off dead, or of hurting yourself in some way: 0 - not at all  PHQ-9 Score: 5   PHQ-9 Interpretation: Mild depression        - Continue zoloft 150 mg daily, remeron, atarax prn, & weekly therapy sessions           Subjective      Todd Terry is a 28 y.o. female  has no past medical history on file. has a past surgical history that includes Bladder repair; Kidney stone surgery; and Ovarian cyst removal (Right).   TCM Call     Date and time call was made  2023 12:18 PM    Hospital care reviewed  Records reviewed    Patient was hospitialized at  [facility]    Date of Admission  23    Date of discharge  23    Diagnosis  right tubo ovarian abscess    Disposition  Home    Current Symptoms  None      TCM Call Post hospital issues  None    Scheduled for follow up? Yes    Did you obtain your prescribed medications  Yes    Do you need help managing your prescriptions or medications  No    Is transportation to your appointment needed  No    I have advised the patient to call PCP with any new or worsening symptoms    Leon Tequilajelly MONIKA    Living Arrangements  Alone      HPI:  Neto Springer is a 28 y. X. [id number] BTA presented with a 5-day history of right lower quadrant pain. On CTAP 2 cystic structures were visualized in the right pelvis which was read as a possible hydrosalpinx but with recommendation for ultrasound to rule out TOA.  Ultrasound was performed and showed "findings concerning for right adnexal tubo-ovarian abscess. No normal right ovarian tissue seen. "      She was admitted for IV antibiotics. She received 24 hours of IV rocephin, flagyl and doxycyline. Her pain was improving and she was transitioned to PO antibiotics. She was then discharged home with instructions to follow up outpatient. Today, she reports feeling much better. Denies fevers, chills, significant pelvic pain, only endorses mild right pelvic "discomfort", has not needed to take any pain medication for this. Reports compliance with doxy/flagyl, has obgyn f/u on 08/30/23    Review of Systems   Constitutional: Negative for chills and fever. HENT: Negative for ear pain and sore throat. Eyes: Negative for pain and visual disturbance. Respiratory: Negative for cough and shortness of breath. Cardiovascular: Negative for chest pain and palpitations. Gastrointestinal: Negative for abdominal pain and vomiting. Genitourinary: Positive for pelvic pain. Negative for dysuria, genital sores, hematuria and menstrual problem. Musculoskeletal: Negative for arthralgias and back pain. Skin: Negative for color change and rash. Neurological: Negative for seizures and syncope. All other systems reviewed and are negative.       Current Outpatient Medications on File Prior to Visit   Medication Sig   • doxycycline hyclate (VIBRAMYCIN) 100 mg capsule Take 1 capsule (100 mg total) by mouth every 12 (twelve) hours for 25 doses   • ergocalciferol (VITAMIN D2) 50,000 units Take 1 capsule (50,000 Units total) by mouth once a week (Patient not taking: Reported on 8/14/2023)   • hydrOXYzine HCL (ATARAX) 25 mg tablet Take 1 tablet (25 mg total) by mouth 3 (three) times a day as needed for anxiety (Patient not taking: Reported on 8/14/2023)   • metroNIDAZOLE (FLAGYL) 500 mg tablet Take 1 tablet (500 mg total) by mouth every 8 (eight) hours for 37 doses   • mirtazapine (REMERON) 30 mg tablet    • naproxen (EC NAPROSYN) 500 MG EC tablet Take 1 tablet (500 mg total) by mouth 2 (two) times a day with meals (Patient not taking: Reported on 8/14/2023)   • ondansetron (ZOFRAN-ODT) 4 mg disintegrating tablet Take 1 tablet (4 mg total) by mouth every 6 (six) hours as needed for nausea or vomiting   • sertraline (ZOLOFT) 100 mg tablet Take 1.5 tablets (150 mg total) by mouth daily (Patient not taking: Reported on 8/14/2023)   • [DISCONTINUED] Diclofenac Sodium (VOLTAREN) 1 % Apply 2 g topically 4 (four) times a day (Patient not taking: Reported on 8/14/2023)       Objective     /70 (BP Location: Right arm, Patient Position: Sitting, Cuff Size: Standard)   Pulse 100   Temp 98.6 °F (37 °C) (Temporal)   Resp 16   Ht 4' 11" (1.499 m)   Wt 40.8 kg (90 lb)   LMP 08/01/2023 (Exact Date)   SpO2 99%   Breastfeeding No   BMI 18.18 kg/m²     Physical Exam  Vitals and nursing note reviewed. HENT:      Head: Normocephalic and atraumatic. Right Ear: External ear normal.      Left Ear: External ear normal.      Nose: Nose normal.      Mouth/Throat:      Mouth: Mucous membranes are moist.      Pharynx: Oropharynx is clear. Eyes:      Extraocular Movements: Extraocular movements intact.       Conjunctiva/sclera: Conjunctivae normal.      Pupils: Pupils are equal, round, and reactive to light. Cardiovascular:      Rate and Rhythm: Normal rate and regular rhythm. Pulses: Normal pulses. Heart sounds: Normal heart sounds. Pulmonary:      Effort: Pulmonary effort is normal.      Breath sounds: Normal breath sounds. Abdominal:      General: Bowel sounds are normal.      Palpations: Abdomen is soft. Tenderness: There is abdominal tenderness in the right lower quadrant and suprapubic area. Musculoskeletal:         General: Normal range of motion. Cervical back: Normal range of motion. Skin:     General: Skin is warm and dry. Neurological:      General: No focal deficit present. Mental Status: She is alert and oriented to person, place, and time. Mental status is at baseline. Psychiatric:         Mood and Affect: Mood normal.         Behavior: Behavior normal.         Thought Content:  Thought content normal.         Judgment: Judgment normal.       Judit Oakes

## 2023-08-30 ENCOUNTER — OFFICE VISIT (OUTPATIENT)
Dept: OBGYN CLINIC | Facility: CLINIC | Age: 33
End: 2023-08-30

## 2023-08-30 VITALS
DIASTOLIC BLOOD PRESSURE: 63 MMHG | BODY MASS INDEX: 18.18 KG/M2 | WEIGHT: 90 LBS | SYSTOLIC BLOOD PRESSURE: 107 MMHG | HEART RATE: 106 BPM

## 2023-08-30 DIAGNOSIS — N70.93 RIGHT TUBO-OVARIAN ABSCESS: Primary | ICD-10-CM

## 2023-08-30 DIAGNOSIS — R11.2 NAUSEA AND VOMITING, UNSPECIFIED VOMITING TYPE: ICD-10-CM

## 2023-08-30 PROCEDURE — 99213 OFFICE O/P EST LOW 20 MIN: CPT | Performed by: OBSTETRICS & GYNECOLOGY

## 2023-08-30 RX ORDER — ONDANSETRON 4 MG/1
4 TABLET, FILM COATED ORAL EVERY 8 HOURS PRN
Qty: 20 TABLET | Refills: 3 | Status: SHIPPED | OUTPATIENT
Start: 2023-08-30

## 2023-08-30 NOTE — PROGRESS NOTES
[address] [facility] Obstetrics & Gynecology    Problem List Items Addressed This Visit        Endocrine    Right tubo-ovarian abscess - Primary     Self discontinued antibiotics because of antibiotic induced nausea. Unsure how many days she has left to complete the prescribed 14-d course. She tolerated the antibiotics with Zofran so refills were sent today and she was advised to finish the course of oral antibiotics. She will follow up with us in 2 weeks at which time we will order a pelvic ultrasound to check on improvement or resolution of previous imaging findings. She may also benefit from a HSG to evaluate for tubal patency given that she reported being unable to conceive a year ago        Other Visit Diagnoses     Nausea and vomiting, unspecified vomiting type        Relevant Medications    ondansetron (ZOFRAN) 4 mg tablet          Subjective:   Naveed Buckner is a 28 y.o. Thais Camara here for follow up. Recent admission 8/14-8/16/23 for TOA requiring IV abx. Did well and was transitioned to oral abx and discharged home to complete a 14-d course. She did not complete the antibiotic course as prescribed because of nausea; she ran out of zofran that was prescribed to her. She denies fevers or chills, is tolerating oral intake and has normal bowel and bladder function. She endorsed concern about previous ovarian cyst surgery and wants to know if this is why she got the TOA. She also reports being unable to get pregnant a year ago when she was trying. Objective:  Vitals: Blood pressure 107/63, pulse (!) 106, weight 40.8 kg (90 lb), last menstrual period 08/01/2023, not currently breastfeeding. Body mass index is 18.18 kg/m². Physical Exam  Vitals reviewed. Constitutional:       General: She is not in acute distress. Appearance: Normal appearance. She is not toxic-appearing. Pulmonary:      Effort: Pulmonary effort is normal. No respiratory distress.    Abdominal:      Palpations: Abdomen is soft.      Tenderness: There is no abdominal tenderness. There is no guarding or rebound. Neurological:      Mental Status: She is alert and oriented to person, place, and time. Psychiatric:         Mood and Affect: Mood normal.         Behavior: Behavior normal.         Patient discussed with Dr. Héctor Saldivar. Adriane Yuen MD  PGY-IV, OB/GYN  8/30/2023

## 2023-08-30 NOTE — ASSESSMENT & PLAN NOTE
Self discontinued antibiotics because of antibiotic induced nausea. Unsure how many days she has left to complete the prescribed 14-d course. She tolerated the antibiotics with Zofran so refills were sent today and she was advised to finish the course of oral antibiotics. She will follow up with us in 2 weeks at which time we will order a pelvic ultrasound to check on improvement or resolution of previous imaging findings.  She may also benefit from a HSG to evaluate for tubal patency given that she reported being unable to conceive a year ago

## 2023-09-13 ENCOUNTER — OFFICE VISIT (OUTPATIENT)
Dept: OBGYN CLINIC | Facility: CLINIC | Age: 33
End: 2023-09-13

## 2023-09-13 VITALS
HEART RATE: 101 BPM | BODY MASS INDEX: 17.86 KG/M2 | WEIGHT: 88.6 LBS | HEIGHT: 59 IN | DIASTOLIC BLOOD PRESSURE: 70 MMHG | SYSTOLIC BLOOD PRESSURE: 107 MMHG

## 2023-09-13 DIAGNOSIS — N70.93 RIGHT TUBO-OVARIAN ABSCESS: Primary | ICD-10-CM

## 2023-09-13 PROCEDURE — 99213 OFFICE O/P EST LOW 20 MIN: CPT | Performed by: OBSTETRICS & GYNECOLOGY

## 2023-09-13 NOTE — PROGRESS NOTES
Subjective:     Susanna Cooper is a 28 y.o.  female who presents for follow up regarding her TOA. She states she was able to complete her antibiotics after not being able to initially finish them due to nausea. She states her pain is completely gone. She reports she notices a white discharge with douching. Objective:    Vitals: Blood pressure 107/70, pulse 101, height 4' 11" (1.499 m), weight 40.2 kg (88 lb 9.6 oz), last menstrual period 09/10/2023, not currently breastfeeding. Body mass index is 17.9 kg/m². Physical Exam  Vitals reviewed. Constitutional:       Appearance: Normal appearance. Cardiovascular:      Rate and Rhythm: Normal rate. Pulmonary:      Effort: Pulmonary effort is normal.   Abdominal:      General: There is no distension. Palpations: Abdomen is soft. Tenderness: There is no abdominal tenderness. Neurological:      Mental Status: She is alert. Assessment/Plan:    TOA  -I advised pt to stop douching as that increases her risk of infection  -Ultrasound was ordered to follow up her TOA to see if resolution has occurred.         Omar Méndez MD  2023  3:52 PM

## 2023-09-18 ENCOUNTER — TELEPHONE (OUTPATIENT)
Dept: OBGYN CLINIC | Facility: CLINIC | Age: 33
End: 2023-09-18

## 2023-09-18 NOTE — TELEPHONE ENCOUNTER
----- Message from Bev Soto MD sent at 9/13/2023  4:28 PM EDT -----  Regarding: Pelvic U/S  Can you call this patient and let her know I ordered a pelvic ultrasound for her to get done to make sure her tubo-ovarian abscess has resolved. She can call [phone] to schedule.   Thanks,  Bev Soto

## 2023-09-21 ENCOUNTER — HOSPITAL ENCOUNTER (OUTPATIENT)
Dept: ULTRASOUND IMAGING | Facility: HOSPITAL | Age: 33
End: 2023-09-21
Payer: COMMERCIAL

## 2023-09-21 DIAGNOSIS — N70.93 RIGHT TUBO-OVARIAN ABSCESS: ICD-10-CM

## 2023-09-21 PROCEDURE — 76830 TRANSVAGINAL US NON-OB: CPT

## 2023-09-21 PROCEDURE — 76856 US EXAM PELVIC COMPLETE: CPT

## 2023-09-27 ENCOUNTER — TELEPHONE (OUTPATIENT)
Dept: OBGYN CLINIC | Facility: CLINIC | Age: 33
End: 2023-09-27

## 2023-09-27 NOTE — RESULT ENCOUNTER NOTE
Please inform patient that Ultrasound shows improvement in her dilated tube from her previous infection. Nothing else needs to be done at this time and she can follow up as previously scheduled.

## 2023-09-27 NOTE — TELEPHONE ENCOUNTER
----- Message from Karley Alarcon MD sent at 9/27/2023  9:02 AM EDT -----  Please inform patient that Ultrasound shows improvement in her dilated tube from her previous infection. Nothing else needs to be done at this time and she can follow up as previously scheduled.

## 2023-12-11 ENCOUNTER — ANNUAL EXAM (OUTPATIENT)
Dept: OBGYN CLINIC | Facility: CLINIC | Age: 33
End: 2023-12-11

## 2023-12-11 VITALS
HEART RATE: 101 BPM | HEIGHT: 59 IN | WEIGHT: 89 LBS | SYSTOLIC BLOOD PRESSURE: 108 MMHG | DIASTOLIC BLOOD PRESSURE: 72 MMHG | BODY MASS INDEX: 17.94 KG/M2

## 2023-12-11 DIAGNOSIS — Z01.419 ENCOUNTER FOR ANNUAL ROUTINE GYNECOLOGICAL EXAMINATION: Primary | ICD-10-CM

## 2023-12-11 DIAGNOSIS — Z13.31 POSITIVE DEPRESSION SCREENING: ICD-10-CM

## 2023-12-11 PROCEDURE — 99395 PREV VISIT EST AGE 18-39: CPT | Performed by: NURSE PRACTITIONER

## 2023-12-11 PROCEDURE — G0476 HPV COMBO ASSAY CA SCREEN: HCPCS | Performed by: NURSE PRACTITIONER

## 2023-12-11 PROCEDURE — G0145 SCR C/V CYTO,THINLAYER,RESCR: HCPCS | Performed by: NURSE PRACTITIONER

## 2023-12-11 NOTE — PROGRESS NOTES
Annual Exam    Assessment   1. Encounter for annual routine gynecological examination  Liquid-based pap, screening      2. Positive depression screening          well woman       Plan       All questions answered. Breast self exam technique reviewed and patient encouraged to perform self-exam monthly. Contraception: none. Discussed healthy lifestyle modifications. Follow up in 1 year. Thin prep Pap smear. Patient Instructions   PAP results can take up 2 weeks  Call with needs or concerns  Return in 1 year  Pt verbalized understanding of all discussed. Subjective      Manuel Weeks is a 28 y.o.  female who presents for annual well woman exam. Periods are regular every 28-30 days, lasting 8 days. No intermenstrual bleeding, spotting, or discharge. New partner x 2-3 months    Last WNL PAP and HPV other positive 2022, negative colpo 1/3/2023    Depression Screening Follow-up Plan: Patient's depression screening was positive with a PHQ-2 score of 4. Their PHQ-9 score was 13. Denies suicidal thought, states she has Psychiatric care she is happy with. Declined social work consult      Current contraception: none, discussed pregnancy risk  History of abnormal Pap smear: yes - 2022 WNL PAP, HPV other positiveBenign colpo 1/3/2024  Family history of uterine or ovarian cancer: no  Regular self breast exam: yes  History of abnormal mammogram: N/A  Family history of breast cancer: no  History of abnormal lipids: unknown  Menstrual History:  OB History          1    Para        Term                AB   1    Living             SAB   1    IAB        Ectopic        Multiple        Live Births                    Menarche age: 14  Patient's last menstrual period was 2023 (exact date).        The following portions of the patient's history were reviewed and updated as appropriate: allergies, current medications, past family history, past medical history, past social history, past surgical history and problem list.    Review of Systems  Pertinent items are noted in HPI.       Objective      /72   Pulse 101   Ht 4' 11" (1.499 m)   Wt 40.4 kg (89 lb)   LMP 11/11/2023 (Exact Date)   BMI 17.98 kg/m²     General: alert and oriented, in no acute distress, alert, appears stated age, and cooperative   Heart: NSR   Lungs: clear to auscultation bilaterally, WNL respiratory effort, negative cough or SOB   Thyroid: Negative masses palpable   Abdomen: soft, non-tender, without masses or organomegaly palpable   Vulva: normal   Vagina: normal mucosa   Cervix: no cervical motion tenderness and no lesions   Uterus: normal size, non-tender, normal shape and consistency   Adnexa: normal adnexa   Urethra: normal   Breasts: NT,negative masses, discharge, or dimpling, bilateral nipple piercing

## 2023-12-11 NOTE — LETTER
2023    To Neto Springer  : 1990      This letter is to advise you that your recent PAP SMEAR results were reviewed by me and are NORMAL.  We will see you in 1 year for your annual exam.    KASSANDRA Kelley

## 2023-12-12 LAB
HPV HR 12 DNA CVX QL NAA+PROBE: NEGATIVE
HPV16 DNA CVX QL NAA+PROBE: NEGATIVE
HPV18 DNA CVX QL NAA+PROBE: NEGATIVE

## 2023-12-16 LAB
LAB AP GYN PRIMARY INTERPRETATION: NORMAL
Lab: NORMAL
PATH INTERP SPEC-IMP: NORMAL

## 2024-02-26 ENCOUNTER — OFFICE VISIT (OUTPATIENT)
Dept: FAMILY MEDICINE CLINIC | Facility: CLINIC | Age: 34
End: 2024-02-26

## 2024-02-26 VITALS
BODY MASS INDEX: 16.93 KG/M2 | OXYGEN SATURATION: 96 % | HEART RATE: 94 BPM | SYSTOLIC BLOOD PRESSURE: 100 MMHG | TEMPERATURE: 98 F | RESPIRATION RATE: 16 BRPM | HEIGHT: 59 IN | DIASTOLIC BLOOD PRESSURE: 70 MMHG | WEIGHT: 84 LBS

## 2024-02-26 DIAGNOSIS — F32.A ANXIETY AND DEPRESSION: Primary | ICD-10-CM

## 2024-02-26 DIAGNOSIS — F41.9 ANXIETY AND DEPRESSION: Primary | ICD-10-CM

## 2024-02-26 PROCEDURE — 99213 OFFICE O/P EST LOW 20 MIN: CPT

## 2024-02-26 NOTE — ASSESSMENT & PLAN NOTE
Continue outpatient mental health services.  Continue Atarax 25 mg up to three times a day, as needed for anxiety.  Begin Zoloft 50 mg daily.

## 2024-02-26 NOTE — PROGRESS NOTES
Name: Neto Springer      : 1990      MRN: [id number]  Encounter Provider: KASSANDRA Hoang  Encounter Date: 2024   [facility]    Assessment & Plan     1. Anxiety and depression  Assessment & Plan:  Continue outpatient mental health services.  Continue Atarax 25 mg up to three times a day, as needed for anxiety.  Begin Zoloft 50 mg daily.      Orders:  -     sertraline (Zoloft) 50 mg tablet; Take 1 tablet (50 mg total) by mouth daily        Depression Screening and Follow-up Plan: Patient's depression screening was positive with a PHQ-9 score of 9.       IVA-7 Flowsheet Screening      Flowsheet Row Most Recent Value   Over the last 2 weeks, how often have you been bothered by any of the following problems?    Feeling nervous, anxious, or on edge 2   Not being able to stop or control worrying 2   Worrying too much about different things 2   Trouble relaxing 2   Being so restless that it is hard to sit still 2   Becoming easily annoyed or irritable 1   Feeling afraid as if something awful might happen 1   IVA-7 Total Score 12              Subjective      Neto Springer is a 34 y.o. female  has a past medical history of TOA (tubo-ovarian abscess).  has a past surgical history that includes Bladder repair; Kidney stone surgery; and Ovarian cyst removal (Right).        Neto Springer is a 34 y.o. female who presents for follow up of depression. Current symptoms include depressed mood, difficulty concentrating, increased anxiety. Symptoms have been gradually worsening since 1 week ago related to coworkers/work.  Patient denies suicidal thoughts. Previous treatment includes: individual therapy and medication. She complains of the following side effects from the treatment: none. She is not currently taking zoloft but it was helping when she was taking it, would like to restart.        Review of Systems   Constitutional:   "Negative for chills and fever.   HENT:  Negative for ear pain and sore throat.    Eyes:  Negative for pain and visual disturbance.   Respiratory:  Negative for cough and shortness of breath.    Cardiovascular:  Negative for chest pain and palpitations.   Gastrointestinal:  Negative for abdominal pain and vomiting.   Genitourinary:  Negative for dysuria and hematuria.   Musculoskeletal:  Negative for arthralgias and back pain.   Skin:  Negative for color change and rash.   Neurological:  Negative for seizures and syncope.   All other systems reviewed and are negative.      Current Outpatient Medications on File Prior to Visit   Medication Sig    hydrOXYzine HCL (ATARAX) 25 mg tablet Take 1 tablet (25 mg total) by mouth 3 (three) times a day as needed for anxiety (Patient not taking: Reported on 8/14/2023)    [DISCONTINUED] ergocalciferol (VITAMIN D2) 50,000 units Take 1 capsule (50,000 Units total) by mouth once a week (Patient not taking: Reported on 8/14/2023)    [DISCONTINUED] mirtazapine (REMERON) 30 mg tablet  (Patient not taking: Reported on 8/30/2023)    [DISCONTINUED] naproxen (EC NAPROSYN) 500 MG EC tablet Take 1 tablet (500 mg total) by mouth 2 (two) times a day with meals (Patient not taking: Reported on 8/14/2023)    [DISCONTINUED] ondansetron (ZOFRAN) 4 mg tablet Take 1 tablet (4 mg total) by mouth every 8 (eight) hours as needed for nausea or vomiting (Patient not taking: Reported on 9/13/2023)    [DISCONTINUED] sertraline (ZOLOFT) 100 mg tablet Take 1.5 tablets (150 mg total) by mouth daily (Patient not taking: Reported on 8/14/2023)       Objective     /70 (BP Location: Left arm, Patient Position: Sitting, Cuff Size: Standard)   Pulse 94   Temp 98 °F (36.7 °C) (Temporal)   Resp 16   Ht 4' 11\" (1.499 m)   Wt 38.1 kg (84 lb)   LMP 02/14/2024   SpO2 96%   BMI 16.97 kg/m²     Physical Exam  Vitals and nursing note reviewed.   Constitutional:       Appearance: Normal appearance.   HENT:      " Head: Normocephalic and atraumatic.      Right Ear: External ear normal.      Left Ear: External ear normal.      Nose: Nose normal.      Mouth/Throat:      Mouth: Mucous membranes are moist.   Eyes:      Extraocular Movements: Extraocular movements intact.      Conjunctiva/sclera: Conjunctivae normal.      Pupils: Pupils are equal, round, and reactive to light.   Cardiovascular:      Rate and Rhythm: Normal rate and regular rhythm.      Pulses: Normal pulses.      Heart sounds: Normal heart sounds.   Pulmonary:      Effort: Pulmonary effort is normal.      Breath sounds: Normal breath sounds.   Abdominal:      General: Bowel sounds are normal.      Palpations: Abdomen is soft.      Tenderness: There is no abdominal tenderness.   Musculoskeletal:         General: Normal range of motion.      Cervical back: Normal range of motion.   Skin:     General: Skin is warm and dry.   Neurological:      General: No focal deficit present.      Mental Status: She is alert and oriented to person, place, and time. Mental status is at baseline.   Psychiatric:         Mood and Affect: Mood normal.         Behavior: Behavior normal.         Thought Content: Thought content normal.         Judgment: Judgment normal.       KASSANDRA Hoang

## 2024-04-05 ENCOUNTER — HOSPITAL ENCOUNTER (EMERGENCY)
Facility: HOSPITAL | Age: 34
Discharge: HOME | End: 2024-04-05
Attending: EMERGENCY MEDICINE
Payer: COMMERCIAL

## 2024-04-05 VITALS
HEART RATE: 83 BPM | RESPIRATION RATE: 16 BRPM | SYSTOLIC BLOOD PRESSURE: 114 MMHG | OXYGEN SATURATION: 100 % | TEMPERATURE: 98.3 F | DIASTOLIC BLOOD PRESSURE: 60 MMHG

## 2024-04-05 DIAGNOSIS — R11.2 NAUSEA AND VOMITING: Primary | ICD-10-CM

## 2024-04-05 DIAGNOSIS — R19.7 DIARRHEA: ICD-10-CM

## 2024-04-05 DIAGNOSIS — J02.9 SORE THROAT: ICD-10-CM

## 2024-04-05 LAB
EXT PREGNANCY TEST URINE: NEGATIVE
EXT. CONTROL: NORMAL
FLUAV RNA RESP QL NAA+PROBE: NEGATIVE
FLUBV RNA RESP QL NAA+PROBE: NEGATIVE
RSV RNA RESP QL NAA+PROBE: NEGATIVE
S PYO DNA THROAT QL NAA+PROBE: NOT DETECTED
SARS-COV-2 RNA RESP QL NAA+PROBE: NEGATIVE

## 2024-04-05 PROCEDURE — 81025 URINE PREGNANCY TEST: CPT

## 2024-04-05 PROCEDURE — 87651 STREP A DNA AMP PROBE: CPT

## 2024-04-05 PROCEDURE — 0241U HB NFCT DS VIR RESP RNA 4 TRGT: CPT

## 2024-04-05 PROCEDURE — 99283 EMERGENCY DEPT VISIT LOW MDM: CPT

## 2024-04-05 PROCEDURE — 99284 EMERGENCY DEPT VISIT MOD MDM: CPT

## 2024-04-05 RX ORDER — ONDANSETRON 4 MG/1
4 TABLET, ORALLY DISINTEGRATING ORAL ONCE
Status: COMPLETED | OUTPATIENT
Start: 2024-04-05 | End: 2024-04-05

## 2024-04-05 RX ORDER — PANTOPRAZOLE SODIUM 20 MG/1
20 TABLET, DELAYED RELEASE ORAL DAILY
Qty: 20 TABLET | Refills: 0 | Status: SHIPPED | OUTPATIENT
Start: 2024-04-05

## 2024-04-05 RX ORDER — ONDANSETRON 4 MG/1
4 TABLET, ORALLY DISINTEGRATING ORAL EVERY 6 HOURS PRN
Qty: 20 TABLET | Refills: 0 | Status: SHIPPED | OUTPATIENT
Start: 2024-04-05

## 2024-04-05 RX ADMIN — ONDANSETRON 4 MG: 4 TABLET, ORALLY DISINTEGRATING ORAL at 15:13

## 2024-04-05 NOTE — ED PROVIDER NOTES
"History  Chief Complaint   Patient presents with    Medical Problem     Patient here with c/o nausea, dizziness, headache, and \"lump in throat\" since Monday. Patient requesting pregnancy test and states that is the reason for her visit today.      34 YOF presents today due to nausea, dizziness, headache and sore throat since Monday. Pt reports she has been feeling off balance which she attributes to not eating as much due to her nausea. Pt also reporting diarrhea. Had 1 episode of vomiting a few days ago. LMP 3/15. Denies abd pain, vaginal bleeding. Denies chest pain, SOB, abd pain, urinary symptoms.     Language barrier: yes, [employer] Pakistani interpretor used   History obtained from: patient          Prior to Admission Medications   Prescriptions Last Dose Informant Patient Reported? Taking?   hydrOXYzine HCL (ATARAX) 25 mg tablet   No No   Sig: Take 1 tablet (25 mg total) by mouth 3 (three) times a day as needed for anxiety   Patient not taking: Reported on 8/14/2023   sertraline (Zoloft) 50 mg tablet   No No   Sig: Take 1 tablet (50 mg total) by mouth daily      Facility-Administered Medications: None       Past Medical History:   Diagnosis Date    TOA (tubo-ovarian abscess)        Past Surgical History:   Procedure Laterality Date    BLADDER REPAIR      KIDNEY STONE SURGERY      OVARIAN CYST REMOVAL Right        Family History   Problem Relation Age of Onset    Diabetes Father      I have reviewed and agree with the history as documented.    E-Cigarette/Vaping    E-Cigarette Use Current Every Day User      E-Cigarette/Vaping Substances    Nicotine Yes     THC No     CBD No     Flavoring No     Other No     Unknown No      Social History     Tobacco Use    Smoking status: Some Days     Types: E-Cigarettes     Passive exposure: Current    Smokeless tobacco: Never    Tobacco comments:     Hookah/ vap   Vaping Use    Vaping status: Every Day    Substances: Nicotine   Substance Use Topics    Alcohol use: Not Currently "    Drug use: Yes     Types: Marijuana     Comment: social       Review of Systems   HENT:  Positive for sore throat.    Gastrointestinal:  Positive for diarrhea, nausea and vomiting.   Neurological:  Positive for dizziness and headaches.   All other systems reviewed and are negative.      Physical Exam  Physical Exam  Vitals and nursing note reviewed.   Constitutional:       General: She is not in acute distress.     Appearance: Normal appearance. She is well-developed. She is not ill-appearing.   HENT:      Head: Normocephalic and atraumatic.      Mouth/Throat:      Pharynx: No oropharyngeal exudate or posterior oropharyngeal erythema.   Eyes:      Conjunctiva/sclera: Conjunctivae normal.   Cardiovascular:      Rate and Rhythm: Normal rate and regular rhythm.      Heart sounds: No murmur heard.  Pulmonary:      Effort: Pulmonary effort is normal. No respiratory distress.      Breath sounds: Normal breath sounds.   Abdominal:      Palpations: Abdomen is soft.      Tenderness: There is no abdominal tenderness.   Musculoskeletal:         General: No swelling. Normal range of motion.      Cervical back: Normal range of motion and neck supple.   Skin:     General: Skin is warm and dry.      Capillary Refill: Capillary refill takes less than 2 seconds.   Neurological:      Mental Status: She is alert.   Psychiatric:         Mood and Affect: Mood normal.         Vital Signs  ED Triage Vitals   Temperature Pulse Respirations Blood Pressure SpO2   04/05/24 1435 04/05/24 1428 04/05/24 1428 04/05/24 1428 04/05/24 1428   98.3 °F (36.8 °C) 83 16 114/60 100 %      Temp Source Heart Rate Source Patient Position - Orthostatic VS BP Location FiO2 (%)   04/05/24 1435 04/05/24 1428 04/05/24 1428 04/05/24 1428 --   Oral Monitor Sitting Right arm       Pain Score       04/05/24 1428       No Pain           Vitals:    04/05/24 1428   BP: 114/60   Pulse: 83   Patient Position - Orthostatic VS: Sitting         Visual Acuity      ED  Medications  Medications   ondansetron (ZOFRAN-ODT) dispersible tablet 4 mg (4 mg Oral Given 4/5/24 1513)       Diagnostic Studies  Results Reviewed       Procedure Component Value Units Date/Time    POCT pregnancy, urine [[id number]]  (Normal) Resulted: 04/05/24 1521    Lab Status: Final result Updated: 04/05/24 1521     EXT Preg Test, Ur Negative     Control Valid    Strep A PCR [[id number]] Collected: 04/05/24 1513    Lab Status: In process Specimen: Throat Updated: 04/05/24 1518    FLU/RSV/COVID - if FLU/RSV clinically relevant [[id number]] Collected: 04/05/24 1513    Lab Status: In process Specimen: Nares from Nose Updated: 04/05/24 1518                   No orders to display              Procedures  Procedures         ED Course                               SBIRT 22yo+      Flowsheet Row Most Recent Value   Initial Alcohol Screen: US AUDIT-C     1. How often do you have a drink containing alcohol? 0 Filed at: 04/05/2024 1427   2. How many drinks containing alcohol do you have on a typical day you are drinking?  0 Filed at: 04/05/2024 1427   3a. Male UNDER 65: How often do you have five or more drinks on one occasion? 0 Filed at: 04/05/2024 1427   3b. FEMALE Any Age, or MALE 65+: How often do you have 4 or more drinks on one occassion? 0 Filed at: 04/05/2024 1427   Audit-C Score 0 Filed at: 04/05/2024 1427   AYDE: How many times in the past year have you...    Used an illegal drug or used a prescription medication for non-medical reasons? Never Filed at: 04/05/2024 1427                      Medical Decision Making  Pregnancy test negative.     Neuro exam completely benign. Based on symptoms and physical exam I suspect that pt has a viral syndrome. Will call if anything is positive.     I have discussed the plan to discharge pt from ED. The patient was discharged in stable condition.  Patient ambulated off the department.  Extensive return to emergency department precautions were discussed.  Follow up with  appropriate providers including primary care physician was discussed.  Patient and/or their  primary decision maker expressed understanding.  Patient remained stable during entire emergency department stay.      Problems Addressed:  Diarrhea: acute illness or injury  Nausea and vomiting: acute illness or injury  Sore throat: acute illness or injury    Amount and/or Complexity of Data Reviewed  Labs: ordered.    Risk  Prescription drug management.             Disposition  Final diagnoses:   Nausea and vomiting   Diarrhea   Sore throat     Time reflects when diagnosis was documented in both MDM as applicable and the Disposition within this note       Time User Action Codes Description Comment    4/5/2024  3:19 PM Alhaji Rea [R11.2] Nausea and vomiting     4/5/2024  3:19 PM Alhaji Rea [R19.7] Diarrhea     4/5/2024  3:23 PM Alhaji Rea [J02.9] Sore throat           ED Disposition       ED Disposition   Discharge    Condition   Stable    Date/Time   Fri Apr 5, 2024 1519    Comment   Neto Springer discharge to home/self care.                   Follow-up Information    None         Patient's Medications   Discharge Prescriptions    ONDANSETRON (ZOFRAN-ODT) 4 MG DISINTEGRATING TABLET    Take 1 tablet (4 mg total) by mouth every 6 (six) hours as needed for vomiting or nausea       Start Date: 4/5/2024  End Date: --       Order Dose: 4 mg       Quantity: 20 tablet    Refills: 0    PANTOPRAZOLE (PROTONIX) 20 MG TABLET    Take 1 tablet (20 mg total) by mouth daily       Start Date: 4/5/2024  End Date: --       Order Dose: 20 mg       Quantity: 20 tablet    Refills: 0       No discharge procedures on file.    PDMP Review       None            ED Provider  Electronically Signed by             Alhaji Rea PA-C  04/05/24 5350

## 2024-04-05 NOTE — DISCHARGE INSTRUCTIONS
-your pregnancy test is negative   -we will call you if any positive results for strep, covid, flu  -take protonix 20 mg daily   -take zofran for nausea     -Tu prueba de embarazo es negativa   -Le llamaremos si hay algún resultado positivo para estreptococos, covid, gripe  -Vale Protonix 20 mg al día   -vale Zofran para las náuseas

## 2024-07-13 ENCOUNTER — APPOINTMENT (EMERGENCY)
Dept: ULTRASOUND IMAGING | Facility: HOSPITAL | Age: 34
End: 2024-07-13
Payer: COMMERCIAL

## 2024-07-13 ENCOUNTER — HOSPITAL ENCOUNTER (EMERGENCY)
Facility: HOSPITAL | Age: 34
Discharge: HOME | End: 2024-07-13
Attending: EMERGENCY MEDICINE | Admitting: EMERGENCY MEDICINE
Payer: COMMERCIAL

## 2024-07-13 VITALS
TEMPERATURE: 97.9 F | DIASTOLIC BLOOD PRESSURE: 62 MMHG | SYSTOLIC BLOOD PRESSURE: 113 MMHG | HEART RATE: 100 BPM | OXYGEN SATURATION: 99 % | RESPIRATION RATE: 20 BRPM | BODY MASS INDEX: 17.59 KG/M2 | WEIGHT: 87.08 LBS

## 2024-07-13 DIAGNOSIS — N73.0 PID (ACUTE PELVIC INFLAMMATORY DISEASE): Primary | ICD-10-CM

## 2024-07-13 DIAGNOSIS — N39.0 UTI (URINARY TRACT INFECTION): ICD-10-CM

## 2024-07-13 LAB
ALBUMIN SERPL BCG-MCNC: 4.1 G/DL (ref 3.5–5)
ALP SERPL-CCNC: 81 U/L (ref 34–104)
ALT SERPL W P-5'-P-CCNC: 13 U/L (ref 7–52)
ANION GAP SERPL CALCULATED.3IONS-SCNC: 9 MMOL/L (ref 4–13)
AST SERPL W P-5'-P-CCNC: 14 U/L (ref 13–39)
BACTERIA UR QL AUTO: ABNORMAL /HPF
BASOPHILS # BLD AUTO: 0.02 THOUSANDS/ÂΜL (ref 0–0.1)
BASOPHILS NFR BLD AUTO: 0 % (ref 0–1)
BILIRUB SERPL-MCNC: 0.47 MG/DL (ref 0.2–1)
BILIRUB UR QL STRIP: NEGATIVE
BUN SERPL-MCNC: 9 MG/DL (ref 5–25)
CALCIUM SERPL-MCNC: 9 MG/DL (ref 8.4–10.2)
CHLORIDE SERPL-SCNC: 106 MMOL/L (ref 96–108)
CLARITY UR: ABNORMAL
CO2 SERPL-SCNC: 22 MMOL/L (ref 21–32)
COLOR UR: ABNORMAL
CREAT SERPL-MCNC: 0.49 MG/DL (ref 0.6–1.3)
EOSINOPHIL # BLD AUTO: 0.05 THOUSAND/ÂΜL (ref 0–0.61)
EOSINOPHIL NFR BLD AUTO: 1 % (ref 0–6)
ERYTHROCYTE [DISTWIDTH] IN BLOOD BY AUTOMATED COUNT: 13.1 % (ref 11.6–15.1)
EXT PREGNANCY TEST URINE: NEGATIVE
EXT. CONTROL: NORMAL
GFR SERPL CREATININE-BSD FRML MDRD: 126 ML/MIN/1.73SQ M
GLUCOSE SERPL-MCNC: 82 MG/DL (ref 65–140)
GLUCOSE UR STRIP-MCNC: NEGATIVE MG/DL
HCT VFR BLD AUTO: 41.4 % (ref 34.8–46.1)
HGB BLD-MCNC: 13.5 G/DL (ref 11.5–15.4)
HGB UR QL STRIP.AUTO: 150
IMM GRANULOCYTES # BLD AUTO: 0.04 THOUSAND/UL (ref 0–0.2)
IMM GRANULOCYTES NFR BLD AUTO: 0 % (ref 0–2)
KETONES UR STRIP-MCNC: NEGATIVE MG/DL
LEUKOCYTE ESTERASE UR QL STRIP: 100
LYMPHOCYTES # BLD AUTO: 1.27 THOUSANDS/ÂΜL (ref 0.6–4.47)
LYMPHOCYTES NFR BLD AUTO: 12 % (ref 14–44)
MCH RBC QN AUTO: 30.1 PG (ref 26.8–34.3)
MCHC RBC AUTO-ENTMCNC: 32.6 G/DL (ref 31.4–37.4)
MCV RBC AUTO: 92 FL (ref 82–98)
MONOCYTES # BLD AUTO: 0.47 THOUSAND/ÂΜL (ref 0.17–1.22)
MONOCYTES NFR BLD AUTO: 5 % (ref 4–12)
NEUTROPHILS # BLD AUTO: 8.65 THOUSANDS/ÂΜL (ref 1.85–7.62)
NEUTS SEG NFR BLD AUTO: 82 % (ref 43–75)
NITRITE UR QL STRIP: NEGATIVE
NON-SQ EPI CELLS URNS QL MICRO: ABNORMAL /HPF
NRBC BLD AUTO-RTO: 0 /100 WBCS
PH UR STRIP.AUTO: 6.5 [PH]
PLATELET # BLD AUTO: 270 THOUSANDS/UL (ref 149–390)
PMV BLD AUTO: 9.2 FL (ref 8.9–12.7)
POTASSIUM SERPL-SCNC: 3.7 MMOL/L (ref 3.5–5.3)
PROT SERPL-MCNC: 7 G/DL (ref 6.4–8.4)
PROT UR STRIP-MCNC: NEGATIVE MG/DL
RBC # BLD AUTO: 4.49 MILLION/UL (ref 3.81–5.12)
RBC #/AREA URNS AUTO: ABNORMAL /HPF
SODIUM SERPL-SCNC: 137 MMOL/L (ref 135–147)
SP GR UR STRIP.AUTO: 1.01 (ref 1–1.04)
UROBILINOGEN UA: NEGATIVE MG/DL
WBC # BLD AUTO: 10.5 THOUSAND/UL (ref 4.31–10.16)
WBC #/AREA URNS AUTO: ABNORMAL /HPF

## 2024-07-13 PROCEDURE — 36415 COLL VENOUS BLD VENIPUNCTURE: CPT

## 2024-07-13 PROCEDURE — 81001 URINALYSIS AUTO W/SCOPE: CPT

## 2024-07-13 PROCEDURE — 87077 CULTURE AEROBIC IDENTIFY: CPT

## 2024-07-13 PROCEDURE — 76830 TRANSVAGINAL US NON-OB: CPT

## 2024-07-13 PROCEDURE — 76856 US EXAM PELVIC COMPLETE: CPT

## 2024-07-13 PROCEDURE — 81514 NFCT DS BV&VAGINITIS DNA ALG: CPT

## 2024-07-13 PROCEDURE — 96365 THER/PROPH/DIAG IV INF INIT: CPT

## 2024-07-13 PROCEDURE — 99284 EMERGENCY DEPT VISIT MOD MDM: CPT

## 2024-07-13 PROCEDURE — 81003 URINALYSIS AUTO W/O SCOPE: CPT

## 2024-07-13 PROCEDURE — 80053 COMPREHEN METABOLIC PANEL: CPT

## 2024-07-13 PROCEDURE — 81025 URINE PREGNANCY TEST: CPT

## 2024-07-13 PROCEDURE — 87491 CHLMYD TRACH DNA AMP PROBE: CPT

## 2024-07-13 PROCEDURE — 96375 TX/PRO/DX INJ NEW DRUG ADDON: CPT

## 2024-07-13 PROCEDURE — 85025 COMPLETE CBC W/AUTO DIFF WBC: CPT

## 2024-07-13 PROCEDURE — 87591 N.GONORRHOEAE DNA AMP PROB: CPT

## 2024-07-13 PROCEDURE — 87186 SC STD MICRODIL/AGAR DIL: CPT

## 2024-07-13 PROCEDURE — 87086 URINE CULTURE/COLONY COUNT: CPT

## 2024-07-13 RX ORDER — DOXYCYCLINE HYCLATE 100 MG/1
100 CAPSULE ORAL 2 TIMES DAILY
Qty: 28 CAPSULE | Refills: 0 | Status: SHIPPED | OUTPATIENT
Start: 2024-07-13 | End: 2024-07-27

## 2024-07-13 RX ORDER — KETOROLAC TROMETHAMINE 30 MG/ML
15 INJECTION, SOLUTION INTRAMUSCULAR; INTRAVENOUS ONCE
Status: COMPLETED | OUTPATIENT
Start: 2024-07-13 | End: 2024-07-13

## 2024-07-13 RX ORDER — METRONIDAZOLE 500 MG/1
500 TABLET ORAL EVERY 12 HOURS SCHEDULED
Qty: 28 TABLET | Refills: 0 | Status: SHIPPED | OUTPATIENT
Start: 2024-07-13 | End: 2024-07-27

## 2024-07-13 RX ORDER — CEFTRIAXONE 1 G/50ML
1000 INJECTION, SOLUTION INTRAVENOUS ONCE
Status: COMPLETED | OUTPATIENT
Start: 2024-07-13 | End: 2024-07-13

## 2024-07-13 RX ADMIN — CEFTRIAXONE 1000 MG: 1 INJECTION, SOLUTION INTRAVENOUS at 10:41

## 2024-07-13 RX ADMIN — KETOROLAC TROMETHAMINE 15 MG: 30 INJECTION, SOLUTION INTRAMUSCULAR; INTRAVENOUS at 10:34

## 2024-07-13 NOTE — DISCHARGE INSTRUCTIONS
Take antibiotics as prescribed. Avoid prolonged sun exposure and alcohol use while taking antibiotics due to adverse side effects. Follow up with OBGYN, PCP. Return to ED sooner for new or worsening symptoms as discussed.     We will call with any positive outstanding test results and make any necessary changes to treatment plan at that time.

## 2024-07-13 NOTE — ED PROVIDER NOTES
"History  Chief Complaint   Patient presents with    Possible UTI     Dysuria x1 week    Vaginal Discharge     White discharge x 1 week \"I have had that before and the doctor gives me a pill\"     34-year-old female with past medical history of tubo-ovarian abscess, ovarian cyst presents to emergency department complaining of 1 week of dysuria and discharge.      History provided by:  Patient   used: Yes    Vaginal Discharge  Vaginal discharge characteristics: \"Creamy, white\"  Duration:  1 week  Chronicity:  Recurrent  Context: spontaneously    Associated symptoms: dysuria and urinary frequency    Associated symptoms: no abdominal pain, no dyspareunia, no fever, no genital lesions, no nausea, no rash, no urinary hesitancy, no urinary incontinence, no vaginal itching and no vomiting        Prior to Admission Medications   Prescriptions Last Dose Informant Patient Reported? Taking?   hydrOXYzine HCL (ATARAX) 25 mg tablet   No No   Sig: Take 1 tablet (25 mg total) by mouth 3 (three) times a day as needed for anxiety   Patient not taking: Reported on 8/14/2023   ondansetron (ZOFRAN-ODT) 4 mg disintegrating tablet   No No   Sig: Take 1 tablet (4 mg total) by mouth every 6 (six) hours as needed for vomiting or nausea   pantoprazole (PROTONIX) 20 mg tablet   No No   Sig: Take 1 tablet (20 mg total) by mouth daily   sertraline (Zoloft) 50 mg tablet   No No   Sig: Take 1 tablet (50 mg total) by mouth daily      Facility-Administered Medications: None       Past Medical History:   Diagnosis Date    TOA (tubo-ovarian abscess)        Past Surgical History:   Procedure Laterality Date    BLADDER REPAIR      KIDNEY STONE SURGERY      OVARIAN CYST REMOVAL Right        Family History   Problem Relation Age of Onset    Diabetes Father      I have reviewed and agree with the history as documented.    E-Cigarette/Vaping    E-Cigarette Use Current Every Day User      E-Cigarette/Vaping Substances    Nicotine Yes     " THC No     CBD No     Flavoring No     Other No     Unknown No      Social History     Tobacco Use    Smoking status: Some Days     Types: E-Cigarettes     Passive exposure: Current    Smokeless tobacco: Never    Tobacco comments:     Hookah/ vap   Vaping Use    Vaping status: Every Day    Substances: Nicotine   Substance Use Topics    Alcohol use: Not Currently    Drug use: Yes     Types: Marijuana     Comment: social       Review of Systems   Constitutional:  Negative for chills and fever.   Respiratory:  Negative for shortness of breath.    Cardiovascular:  Negative for chest pain.   Gastrointestinal:  Negative for abdominal pain, nausea and vomiting.   Genitourinary:  Positive for dysuria, pelvic pain and vaginal discharge. Negative for bladder incontinence, difficulty urinating, dyspareunia, flank pain, frequency, hematuria, hesitancy, urgency, vaginal bleeding and vaginal pain.   Musculoskeletal:  Negative for back pain.   Skin:  Negative for rash.   Neurological:  Negative for syncope.   All other systems reviewed and are negative.      Physical Exam  Physical Exam  Vitals and nursing note reviewed. Exam conducted with a chaperone present ( [facility]).   Constitutional:       General: She is not in acute distress.     Appearance: Normal appearance. She is not ill-appearing, toxic-appearing or diaphoretic.   HENT:      Head: Normocephalic and atraumatic.      Mouth/Throat:      Mouth: Mucous membranes are moist.      Pharynx: Oropharynx is clear.   Cardiovascular:      Rate and Rhythm: Normal rate and regular rhythm.      Heart sounds: Normal heart sounds.   Pulmonary:      Effort: Pulmonary effort is normal. No respiratory distress.      Breath sounds: Normal breath sounds.   Abdominal:      General: There is no distension.      Palpations: Abdomen is soft.      Tenderness: There is abdominal tenderness in the suprapubic area. There is no right CVA tenderness, left CVA tenderness or guarding.    Genitourinary:     General: Normal vulva.      Vagina: Vaginal discharge present. No bleeding or lesions.      Cervix: Cervical motion tenderness and discharge present. No friability, erythema or cervical bleeding.      Adnexa:         Right: Tenderness present.         Left: No tenderness.     Skin:     General: Skin is warm and dry.      Capillary Refill: Capillary refill takes less than 2 seconds.      Findings: No rash.   Neurological:      Mental Status: She is alert.      Gait: Gait normal.         Vital Signs  ED Triage Vitals   Temperature Pulse Respirations Blood Pressure SpO2   07/13/24 0915 07/13/24 0915 07/13/24 0915 07/13/24 0915 07/13/24 0915   97.9 °F (36.6 °C) 100 20 113/62 99 %      Temp Source Heart Rate Source Patient Position - Orthostatic VS BP Location FiO2 (%)   07/13/24 0915 07/13/24 0915 07/13/24 0915 07/13/24 0915 --   Oral Monitor Sitting Left arm       Pain Score       07/13/24 1034       6           Vitals:    07/13/24 0915   BP: 113/62   Pulse: 100   Patient Position - Orthostatic VS: Sitting         Visual Acuity      ED Medications  Medications   ketorolac (TORADOL) injection 15 mg (15 mg Intravenous Given 7/13/24 1034)   cefTRIAXone (ROCEPHIN) IVPB (premix in dextrose) 1,000 mg 50 mL (0 mg Intravenous Stopped 7/13/24 1148)       Diagnostic Studies  Results Reviewed       Procedure Component Value Units Date/Time    Comprehensive metabolic panel [[id number]]  (Abnormal) Collected: 07/13/24 1134    Lab Status: Final result Specimen: Blood from Arm, Right Updated: 07/13/24 1205     Sodium 137 mmol/L      Potassium 3.7 mmol/L      Chloride 106 mmol/L      CO2 22 mmol/L      ANION GAP 9 mmol/L      BUN 9 mg/dL      Creatinine 0.49 mg/dL      Glucose 82 mg/dL      Calcium 9.0 mg/dL      AST 14 U/L      ALT 13 U/L      Alkaline Phosphatase 81 U/L      Total Protein 7.0 g/dL      Albumin 4.1 g/dL      Total Bilirubin 0.47 mg/dL      eGFR 126 ml/min/1.73sq m     Narrative:      National  Kidney Disease Foundation guidelines for Chronic Kidney Disease (CKD):     Stage 1 with normal or high GFR (GFR > 90 mL/min/1.73 square meters)    Stage 2 Mild CKD (GFR = 60-89 mL/min/1.73 square meters)    Stage 3A Moderate CKD (GFR = 45-59 mL/min/1.73 square meters)    Stage 3B Moderate CKD (GFR = 30-44 mL/min/1.73 square meters)    Stage 4 Severe CKD (GFR = 15-29 mL/min/1.73 square meters)    Stage 5 End Stage CKD (GFR <15 mL/min/1.73 square meters)  Note: GFR calculation is accurate only with a steady state creatinine    CBC and differential [[id number]]  (Abnormal) Collected: 07/13/24 1027    Lab Status: Final result Specimen: Blood from Arm, Right Updated: 07/13/24 1032     WBC 10.50 Thousand/uL      RBC 4.49 Million/uL      Hemoglobin 13.5 g/dL      Hematocrit 41.4 %      MCV 92 fL      MCH 30.1 pg      MCHC 32.6 g/dL      RDW 13.1 %      MPV 9.2 fL      Platelets 270 Thousands/uL      nRBC 0 /100 WBCs      Segmented % 82 %      Immature Grans % 0 %      Lymphocytes % 12 %      Monocytes % 5 %      Eosinophils Relative 1 %      Basophils Relative 0 %      Absolute Neutrophils 8.65 Thousands/µL      Absolute Immature Grans 0.04 Thousand/uL      Absolute Lymphocytes 1.27 Thousands/µL      Absolute Monocytes 0.47 Thousand/µL      Eosinophils Absolute 0.05 Thousand/µL      Basophils Absolute 0.02 Thousands/µL     Urine Microscopic [[id number]]  (Abnormal) Collected: 07/13/24 0937    Lab Status: Final result Specimen: Urine, Other Updated: 07/13/24 1010     RBC, UA 0-1 /hpf      WBC, UA 10-20 /hpf      Epithelial Cells Occasional /hpf      Bacteria, UA Occasional /hpf     Urine culture [[id number]] Collected: 07/13/24 0937    Lab Status: In process Specimen: Urine, Other Updated: 07/13/24 1010    UA w Reflex to Microscopic w Reflex to Culture [[id number]]  (Abnormal) Collected: 07/13/24 0937    Lab Status: Final result Specimen: Urine, Other Updated: 07/13/24 0949     Color, UA Straw     Clarity, UA Slightly Cloudy      Specific Gravity, UA 1.010     pH, UA 6.5     Leukocytes, .0     Nitrite, UA Negative     Protein, UA Negative mg/dl      Glucose, UA Negative mg/dl      Ketones, UA Negative mg/dl      Bilirubin, UA Negative     Occult Blood, .0     UROBILINOGEN UA Negative mg/dL     POCT pregnancy, urine [[id number]]  (Normal) Resulted: 07/13/24 0943    Lab Status: Final result Updated: 07/13/24 0943     EXT Preg Test, Ur Negative     Control Valid    Molecular Vaginal Panel [[id number]] Collected: 07/13/24 0937    Lab Status: In process Specimen: Genital from Vaginal Updated: 07/13/24 0942    Chlamydia/GC amplified DNA by PCR [[id number]] Collected: 07/13/24 0937    Lab Status: In process Specimen: Urine, Other Updated: 07/13/24 0942                   US pelvis complete w transvaginal   Final Result by Isacc Salmeron MD (07/13 1253)      Normal pelvic ultrasound. Previously noted possible right-sided hydrosalpinx is not visualized on the current examination.                     Resident: JOSEPH WRIGHT I, the attending radiologist, have reviewed the images and agree with the final report above.      Workstation performed: [id number]                    Procedures  Procedures         ED Course  ED Course as of 07/14/24 0850   Sat Jul 13, 2024   1257 US pelvis complete w transvaginal  IMPRESSION:     Normal pelvic ultrasound. Previously noted possible right-sided hydrosalpinx is not visualized on the current examination.                                         SBIRT 22yo+      Flowsheet Row Most Recent Value   Initial Alcohol Screen: US AUDIT-C     1. How often do you have a drink containing alcohol? 0 Filed at: 07/13/2024 0919   2. How many drinks containing alcohol do you have on a typical day you are drinking?  0 Filed at: 07/13/2024 0919   3a. Male UNDER 65: How often do you have five or more drinks on one occasion? 0 Filed at: 07/13/2024 0919   3b. FEMALE Any Age, or MALE 65+: How often do you have 4 or  "more drinks on one occassion? 0 Filed at: 07/13/2024 0919   Audit-C Score 0 Filed at: 07/13/2024 0919   AYDE: How many times in the past year have you...    Used an illegal drug or used a prescription medication for non-medical reasons? Never Filed at: 07/13/2024 0919                      Medical Decision Making  Ddx include but is not limited to uti, urethritis, pid, toa,. Vss. Afebrile. Abdominal exam without peritoneal signs. +discharge and adnexal/cmt tenderness. UA with leuks, occasional bacteria, given 1g ceftriaxone. Ultrasound without acute findings. Patient non-toxic, pain controlled, tolerating PO. Std testing pending. Plan for empiric treatment for suspected pid with additional doxy and metronidazole, close gyn follow up.     All imaging and/or lab testing discussed with patient, strict return to ED precautions discussed. Patient recommended to follow up promptly with appropriate outpatient provider and risk of morbidity/mortality if patient does not follow up as recommended was discussed. Patient and/or family members verbalizes understanding and agrees with plan. Patient and/or family members were given opportunity to ask questions, all questions were answered at this time. Patient is stable for discharge.     Portions of the record may have been created with voice recognition software. Occasional wrong word or \"sound a like\" substitutions may have occurred due to the inherent limitations of voice recognition software. Read the chart carefully and recognize, using context, where substitutions have occurred.       Amount and/or Complexity of Data Reviewed  Labs: ordered.  Radiology: ordered. Decision-making details documented in ED Course.    Risk  Prescription drug management.                 Disposition  Final diagnoses:   PID (acute pelvic inflammatory disease)   UTI (urinary tract infection)     Time reflects when diagnosis was documented in both MDM as applicable and the Disposition within this note "       Time User Action Codes Description Comment    7/13/2024 12:09 PM Erica Amezquita Add [N73.0] PID (acute pelvic inflammatory disease)     7/13/2024 12:09 PM Erica Amezquita [N39.0] UTI (urinary tract infection)           ED Disposition       ED Disposition   Discharge    Condition   Stable    Date/Time   Sat Jul 13, 2024 1258    Comment   Neto MagallanesMargaret discharge to home/self care.                   Follow-up Information       Follow up With Specialties Details Why Contact Info Additional Information    [facility] Obstetrics and Gynecology Schedule an appointment as soon as possible for a visit in 1 week For follow up regarding your symptoms [address] [place] [id number] [phone] [facility], [address] [place] [address], [place], [place], [address] [phone]    [facility], Nurse Practitioner   [address] [place] [id number] [phone]               Discharge Medication List as of 7/13/2024 12:58 PM        START taking these medications    Details   doxycycline hyclate (VIBRAMYCIN) 100 mg capsule Take 1 capsule (100 mg total) by mouth 2 (two) times a day for 14 days, Starting Sat 7/13/2024, Until Sat 7/27/2024, Print      metroNIDAZOLE (FLAGYL) 500 mg tablet Take 1 tablet (500 mg total) by mouth every 12 (twelve) hours for 14 days, Starting Sat 7/13/2024, Until Sat 7/27/2024, Print           CONTINUE these medications which have NOT CHANGED    Details   hydrOXYzine HCL (ATARAX) 25 mg tablet Take 1 tablet (25 mg total) by mouth 3 (three) times a day as needed for anxiety, Starting Wed 1/18/2023, Normal      ondansetron (ZOFRAN-ODT) 4 mg disintegrating tablet Take 1 tablet (4 mg total) by mouth every 6 (six) hours as needed for vomiting or nausea, Starting Fri 4/5/2024, Normal      pantoprazole (PROTONIX) 20 mg tablet Take 1 tablet (20 mg total) by mouth daily,  Starting Fri 4/5/2024, Normal      sertraline (Zoloft) 50 mg tablet Take 1 tablet (50 mg total) by mouth daily, Starting Mon 2/26/2024, Normal             No discharge procedures on file.    PDMP Review       None            ED Provider  Electronically Signed by             Erica Amezquita PA-C  07/14/24 0812

## 2024-07-14 LAB
C GLABRATA DNA VAG QL NAA+PROBE: NEGATIVE
C KRUSEI DNA VAG QL NAA+PROBE: NEGATIVE
CANDIDA SP 6 PNL VAG NAA+PROBE: NEGATIVE
T VAGINALIS DNA VAG QL NAA+PROBE: NEGATIVE
VAGINOSIS/ITIS DNA PNL VAG PROBE+SIG AMP: POSITIVE

## 2024-07-15 LAB
BACTERIA UR CULT: ABNORMAL
C TRACH DNA SPEC QL NAA+PROBE: NEGATIVE
N GONORRHOEA DNA SPEC QL NAA+PROBE: NEGATIVE

## 2024-07-16 ENCOUNTER — OFFICE VISIT (OUTPATIENT)
Dept: OBGYN CLINIC | Facility: CLINIC | Age: 34
End: 2024-07-16

## 2024-07-16 VITALS
BODY MASS INDEX: 17.5 KG/M2 | HEART RATE: 89 BPM | WEIGHT: 86.8 LBS | HEIGHT: 59 IN | SYSTOLIC BLOOD PRESSURE: 121 MMHG | DIASTOLIC BLOOD PRESSURE: 69 MMHG

## 2024-07-16 DIAGNOSIS — N76.0 BACTERIAL VAGINOSIS: ICD-10-CM

## 2024-07-16 DIAGNOSIS — B96.89 BACTERIAL VAGINOSIS: ICD-10-CM

## 2024-07-16 DIAGNOSIS — N30.00 ACUTE CYSTITIS WITHOUT HEMATURIA: Primary | ICD-10-CM

## 2024-07-16 PROBLEM — N39.0 UTI (URINARY TRACT INFECTION): Status: ACTIVE | Noted: 2024-07-16

## 2024-07-16 PROCEDURE — 99213 OFFICE O/P EST LOW 20 MIN: CPT | Performed by: OBSTETRICS & GYNECOLOGY

## 2024-07-16 PROCEDURE — 81003 URINALYSIS AUTO W/O SCOPE: CPT | Performed by: OBSTETRICS & GYNECOLOGY

## 2024-07-16 NOTE — ASSESSMENT & PLAN NOTE
Symptoms resolving  S/p ceftriaxone  Urine culture positive for klebsiella  Instructed to call the office if her symptoms get worse or do not continue to improve  Recommended Azo for further symptomatic relief

## 2024-07-16 NOTE — ASSESSMENT & PLAN NOTE
Continue flagyl  Instructed to call office should her discharge persist or she develops new itching or odor

## 2024-07-17 LAB
SL AMB  POCT GLUCOSE, UA: ABNORMAL
SL AMB LEUKOCYTE ESTERASE,UA: ABNORMAL
SL AMB POCT BILIRUBIN,UA: ABNORMAL
SL AMB POCT BLOOD,UA: ABNORMAL
SL AMB POCT CLARITY,UA: CLEAR
SL AMB POCT COLOR,UA: YELLOW
SL AMB POCT KETONES,UA: ABNORMAL
SL AMB POCT NITRITE,UA: ABNORMAL
SL AMB POCT PH,UA: 6
SL AMB POCT SPECIFIC GRAVITY,UA: 1.03
SL AMB POCT URINE PROTEIN: ABNORMAL
SL AMB POCT UROBILINOGEN: ABNORMAL

## 2024-08-15 PROBLEM — N39.0 UTI (URINARY TRACT INFECTION): Status: RESOLVED | Noted: 2024-07-16 | Resolved: 2024-08-15

## 2024-09-19 ENCOUNTER — OFFICE VISIT (OUTPATIENT)
Dept: FAMILY MEDICINE CLINIC | Facility: CLINIC | Age: 34
End: 2024-09-19

## 2024-09-19 VITALS
OXYGEN SATURATION: 98 % | HEART RATE: 91 BPM | DIASTOLIC BLOOD PRESSURE: 80 MMHG | WEIGHT: 85 LBS | TEMPERATURE: 98 F | HEIGHT: 59 IN | RESPIRATION RATE: 16 BRPM | BODY MASS INDEX: 17.14 KG/M2 | SYSTOLIC BLOOD PRESSURE: 100 MMHG

## 2024-09-19 DIAGNOSIS — R30.0 DYSURIA: Primary | ICD-10-CM

## 2024-09-19 DIAGNOSIS — F32.A ANXIETY AND DEPRESSION: ICD-10-CM

## 2024-09-19 DIAGNOSIS — Z13.220 SCREENING CHOLESTEROL LEVEL: ICD-10-CM

## 2024-09-19 DIAGNOSIS — N89.8 VAGINAL PRURITUS: ICD-10-CM

## 2024-09-19 DIAGNOSIS — F41.9 ANXIETY AND DEPRESSION: ICD-10-CM

## 2024-09-19 PROCEDURE — 87660 TRICHOMONAS VAGIN DIR PROBE: CPT

## 2024-09-19 PROCEDURE — 99214 OFFICE O/P EST MOD 30 MIN: CPT

## 2024-09-19 PROCEDURE — 87077 CULTURE AEROBIC IDENTIFY: CPT

## 2024-09-19 PROCEDURE — 87480 CANDIDA DNA DIR PROBE: CPT

## 2024-09-19 PROCEDURE — 87086 URINE CULTURE/COLONY COUNT: CPT

## 2024-09-19 PROCEDURE — 81003 URINALYSIS AUTO W/O SCOPE: CPT

## 2024-09-19 PROCEDURE — 87186 SC STD MICRODIL/AGAR DIL: CPT

## 2024-09-19 PROCEDURE — 87510 GARDNER VAG DNA DIR PROBE: CPT

## 2024-09-19 RX ORDER — NITROFURANTOIN 25; 75 MG/1; MG/1
100 CAPSULE ORAL 2 TIMES DAILY
Qty: 10 CAPSULE | Refills: 0 | Status: SHIPPED | OUTPATIENT
Start: 2024-09-19 | End: 2024-09-24

## 2024-09-19 RX ORDER — SERTRALINE HYDROCHLORIDE 100 MG/1
100 TABLET, FILM COATED ORAL DAILY
Qty: 60 TABLET | Refills: 0 | Status: SHIPPED | OUTPATIENT
Start: 2024-09-19

## 2024-09-19 RX ORDER — MIRTAZAPINE 30 MG/1
TABLET, FILM COATED ORAL
COMMUNITY
Start: 2024-09-18

## 2024-09-19 NOTE — ASSESSMENT & PLAN NOTE
IVA-7 Flowsheet Screening       Flowsheet Row Most Recent Value   Over the last two weeks, how often have you been bothered by the following problems?      Feeling nervous, anxious, or on edge 3   Not being able to stop or control worrying 3   Worrying too much about different things 1   Trouble relaxing  3   Being so restless that it's hard to sit still 3   Becoming easily annoyed or irritable  3   Feeling afraid as if something awful might happen 3   How difficult have these problems made it for you to do your work, take care of things at home, or get along with other people?  Very difficult   IVA Score  19             PHQ-2/9 Depression Screening       Little interest or pleasure in doing things: 3 - nearly every day  Feeling down, depressed, or hopeless: 3 - nearly every day  Trouble falling or staying asleep, or sleeping too much: 0 - not at all  Feeling tired or having little energy: 3 - nearly every day  Poor appetite or overeatin - not at all  Feeling bad about yourself - or that you are a failure or have let yourself or your family down: 0 - not at all  Trouble concentrating on things, such as reading the newspaper or watching television: 3 - nearly every day  Moving or speaking so slowly that other people could have noticed. Or the opposite - being so fidgety or restless that you have been moving around a lot more than usual: 0 - not at all  Thoughts that you would be better off dead, or of hurting yourself in some way: 1 - several days  PHQ-9 Score: 13  PHQ-9 Interpretation: Moderate depression            Depression Screening Follow-up Plan: Patient's depression screening was positive with a PHQ-9 score of 13. Patient assessed for underlying major depression. They have no active suicidal ideations. Brief counseling provided and recommend additional follow-up/re-evaluation next office visit. Increase zoloft from 50 mg to 100 mg daily. Atarax prn.  Orders:    sertraline (ZOLOFT) 100 mg tablet; Take 1  tablet (100 mg total) by mouth daily    Comprehensive metabolic panel; Future    CBC and differential; Future    Vitamin D 25 hydroxy; Future    TSH, 3rd generation with Free T4 reflex; Future    Vitamin B12; Future

## 2024-09-19 NOTE — PROGRESS NOTES
Ambulatory Visit  Name: Neto Springer      : 1990      MRN: [id number]  Encounter Provider: KASSANDRA Hoang  Encounter Date: 2024   Encounter department: [facility]    Assessment & Plan  Dysuria  Urine dip: (+) leuks  Encouraged hydration  Start macrobid bid x 5 days  Send urine for c&S    Orders:    POCT urine dip auto non-scope    Urine culture    nitrofurantoin (MACROBID) 100 mg capsule; Take 1 capsule (100 mg total) by mouth 2 (two) times a day for 5 days    Vaginal pruritus    Orders:    Vaginosis DNA Probe    Anxiety and depression  IVA-7 Flowsheet Screening      Flowsheet Row Most Recent Value   Over the last two weeks, how often have you been bothered by the following problems?     Feeling nervous, anxious, or on edge 3   Not being able to stop or control worrying 3   Worrying too much about different things 1   Trouble relaxing  3   Being so restless that it's hard to sit still 3   Becoming easily annoyed or irritable  3   Feeling afraid as if something awful might happen 3   How difficult have these problems made it for you to do your work, take care of things at home, or get along with other people?  Very difficult   IVA Score  19          PHQ-2/9 Depression Screening    Little interest or pleasure in doing things: 3 - nearly every day  Feeling down, depressed, or hopeless: 3 - nearly every day  Trouble falling or staying asleep, or sleeping too much: 0 - not at all  Feeling tired or having little energy: 3 - nearly every day  Poor appetite or overeatin - not at all  Feeling bad about yourself - or that you are a failure or have let yourself or your family down: 0 - not at all  Trouble concentrating on things, such as reading the newspaper or watching television: 3 - nearly every day  Moving or speaking so slowly that other people could have noticed. Or the opposite - being so fidgety or restless that you have been moving around a  lot more than usual: 0 - not at all  Thoughts that you would be better off dead, or of hurting yourself in some way: 1 - several days  PHQ-9 Score: 13  PHQ-9 Interpretation: Moderate depression        Depression Screening Follow-up Plan: Patient's depression screening was positive with a PHQ-9 score of 13. Patient assessed for underlying major depression. They have no active suicidal ideations. Brief counseling provided and recommend additional follow-up/re-evaluation next office visit. Increase zoloft from 50 mg to 100 mg daily. Atarax prn.     Orders:    sertraline (ZOLOFT) 100 mg tablet; Take 1 tablet (100 mg total) by mouth daily    Comprehensive metabolic panel; Future    CBC and differential; Future    Vitamin D 25 hydroxy; Future    TSH, 3rd generation with Free T4 reflex; Future    Vitamin B12; Future    Screening cholesterol level    Orders:    Lipid panel; Future      Depression Screening and Follow-up Plan: Patient's depression screening was positive with a PHQ-9 score of 13.       History of Present Illness     Anxiety/Depression- Has been feeling increased anxiety related to her father passing away a few years ago. Taking atarax up to 2-3x a week with good relief. Has started seeing a therapist every 15 days. Has helped significantly. Reports that she also sees psychiatry every 2 months. She was advised by them to come here for routine blood work.     She presents today for vaginal pruritus, urinary burning and urinary urgency for the last 2 days, states she has the same symptoms of previous UTIs. Also has thick white odorless discharge. Denies ulcerations or opened areas to labia.     Vaginal Itching  The patient's primary symptoms include vaginal discharge. Associated symptoms include dysuria and frequency. Pertinent negatives include no abdominal pain, back pain, chills, fever, hematuria, rash, sore throat or vomiting.         Review of Systems   Constitutional:  Negative for chills and fever.  "  HENT:  Negative for ear pain and sore throat.    Eyes:  Negative for pain and visual disturbance.   Respiratory:  Negative for cough and shortness of breath.    Cardiovascular:  Negative for chest pain and palpitations.   Gastrointestinal:  Negative for abdominal pain and vomiting.   Genitourinary:  Positive for dysuria, frequency and vaginal discharge. Negative for hematuria.   Musculoskeletal:  Negative for arthralgias and back pain.   Skin:  Negative for color change and rash.   Neurological:  Negative for seizures and syncope.   All other systems reviewed and are negative.          Objective     /80 (BP Location: Right arm, Patient Position: Sitting, Cuff Size: Standard)   Pulse 91   Temp 98 °F (36.7 °C) (Temporal)   Resp 16   Ht 4' 11\" (1.499 m)   Wt 38.6 kg (85 lb)   LMP 09/14/2024 (Exact Date)   SpO2 98%   BMI 17.17 kg/m²     Physical Exam  Vitals and nursing note reviewed.   Constitutional:       General: She is not in acute distress.     Appearance: Normal appearance. She is well-developed.   HENT:      Head: Normocephalic and atraumatic.      Right Ear: External ear normal.      Left Ear: External ear normal.      Nose: Nose normal.   Eyes:      Conjunctiva/sclera: Conjunctivae normal.   Cardiovascular:      Rate and Rhythm: Normal rate and regular rhythm.      Pulses: Normal pulses.      Heart sounds: Normal heart sounds. No murmur heard.  Pulmonary:      Effort: Pulmonary effort is normal. No respiratory distress.      Breath sounds: Normal breath sounds.   Abdominal:      General: Bowel sounds are normal.      Palpations: Abdomen is soft.      Tenderness: There is no abdominal tenderness.   Musculoskeletal:         General: No swelling. Normal range of motion.      Cervical back: Normal range of motion and neck supple.   Skin:     General: Skin is warm and dry.      Capillary Refill: Capillary refill takes less than 2 seconds.   Neurological:      General: No focal deficit present.      " Mental Status: She is alert and oriented to person, place, and time. Mental status is at baseline.   Psychiatric:         Mood and Affect: Mood normal.         Behavior: Behavior normal.         Thought Content: Thought content normal.         Judgment: Judgment normal.

## 2024-09-19 NOTE — PROGRESS NOTES
Ambulatory Visit  Name: Neto Springer      : 1990      MRN: [id number]  Encounter Provider: KASSANDRA Hoang  Encounter Date: 2024   Encounter department: [facility]    Assessment & Plan  Dysuria  Urine dip: (+) leuks  Encouraged hydration  Start macrobid bid x 5 days  Send urine for c&S  Orders:    POCT urine dip auto non-scope    Urine culture    nitrofurantoin (MACROBID) 100 mg capsule; Take 1 capsule (100 mg total) by mouth 2 (two) times a day for 5 days    Vaginal pruritus    Orders:    Vaginosis DNA Probe    Anxiety and depression  IVA-7 Flowsheet Screening       Flowsheet Row Most Recent Value   Over the last two weeks, how often have you been bothered by the following problems?      Feeling nervous, anxious, or on edge 3   Not being able to stop or control worrying 3   Worrying too much about different things 1   Trouble relaxing  3   Being so restless that it's hard to sit still 3   Becoming easily annoyed or irritable  3   Feeling afraid as if something awful might happen 3   How difficult have these problems made it for you to do your work, take care of things at home, or get along with other people?  Very difficult   IVA Score  19             PHQ-2/9 Depression Screening       Little interest or pleasure in doing things: 3 - nearly every day  Feeling down, depressed, or hopeless: 3 - nearly every day  Trouble falling or staying asleep, or sleeping too much: 0 - not at all  Feeling tired or having little energy: 3 - nearly every day  Poor appetite or overeatin - not at all  Feeling bad about yourself - or that you are a failure or have let yourself or your family down: 0 - not at all  Trouble concentrating on things, such as reading the newspaper or watching television: 3 - nearly every day  Moving or speaking so slowly that other people could have noticed. Or the opposite - being so fidgety or restless that you have been moving  around a lot more than usual: 0 - not at all  Thoughts that you would be better off dead, or of hurting yourself in some way: 1 - several days  PHQ-9 Score: 13  PHQ-9 Interpretation: Moderate depression            Depression Screening Follow-up Plan: Patient's depression screening was positive with a PHQ-9 score of 13. Patient assessed for underlying major depression. They have no active suicidal ideations. Brief counseling provided and recommend additional follow-up/re-evaluation next office visit. Increase zoloft from 50 mg to 100 mg daily. Atarax prn.  Orders:    sertraline (ZOLOFT) 100 mg tablet; Take 1 tablet (100 mg total) by mouth daily    Comprehensive metabolic panel; Future    CBC and differential; Future    Vitamin D 25 hydroxy; Future    TSH, 3rd generation with Free T4 reflex; Future    Vitamin B12; Future    Screening cholesterol level    Orders:    Lipid panel; Future      Depression Screening and Follow-up Plan: Patient's depression screening was positive with a PHQ-9 score of 13.       History of Present Illness     Anxiety/Depression- Has been feeling increased anxiety related to her father passing away a few years ago. Taking atarax up to 2-3x a week with good relief. Has started seeing a therapist every 15 days. Has helped significantly. Reports that she also sees psychiatry every 2 months. She was advised by them to come here for routine blood work.     She presents today for vaginal pruritus, urinary burning and urinary urgency for the last 2 days, states she has the same symptoms of previous UTIs. Also has thick white odorless discharge. Denies ulcerations or opened areas to labia.     Vaginal Itching  The patient's primary symptoms include vaginal discharge. Associated symptoms include dysuria and frequency. Pertinent negatives include no abdominal pain, back pain, chills, fever, hematuria, rash, sore throat or vomiting.         Review of Systems   Constitutional:  Negative for chills and  "fever.   HENT:  Negative for ear pain and sore throat.    Eyes:  Negative for pain and visual disturbance.   Respiratory:  Negative for cough and shortness of breath.    Cardiovascular:  Negative for chest pain and palpitations.   Gastrointestinal:  Negative for abdominal pain and vomiting.   Genitourinary:  Positive for dysuria, frequency and vaginal discharge. Negative for hematuria.   Musculoskeletal:  Negative for arthralgias and back pain.   Skin:  Negative for color change and rash.   Neurological:  Negative for seizures and syncope.   All other systems reviewed and are negative.          Objective     /80 (BP Location: Right arm, Patient Position: Sitting, Cuff Size: Standard)   Pulse 91   Temp 98 °F (36.7 °C) (Temporal)   Resp 16   Ht 4' 11\" (1.499 m)   Wt 38.6 kg (85 lb)   LMP 09/14/2024 (Exact Date)   SpO2 98%   BMI 17.17 kg/m²     Physical Exam  Vitals and nursing note reviewed.   Constitutional:       General: She is not in acute distress.     Appearance: Normal appearance. She is well-developed.   HENT:      Head: Normocephalic and atraumatic.      Right Ear: External ear normal.      Left Ear: External ear normal.      Nose: Nose normal.   Eyes:      Conjunctiva/sclera: Conjunctivae normal.   Cardiovascular:      Rate and Rhythm: Normal rate and regular rhythm.      Pulses: Normal pulses.      Heart sounds: Normal heart sounds. No murmur heard.  Pulmonary:      Effort: Pulmonary effort is normal. No respiratory distress.      Breath sounds: Normal breath sounds.   Abdominal:      General: Bowel sounds are normal.      Palpations: Abdomen is soft.      Tenderness: There is no abdominal tenderness.   Musculoskeletal:         General: No swelling. Normal range of motion.      Cervical back: Normal range of motion and neck supple.   Skin:     General: Skin is warm and dry.      Capillary Refill: Capillary refill takes less than 2 seconds.   Neurological:      General: No focal deficit " present.      Mental Status: She is alert and oriented to person, place, and time. Mental status is at baseline.   Psychiatric:         Mood and Affect: Mood normal.         Behavior: Behavior normal.         Thought Content: Thought content normal.         Judgment: Judgment normal.

## 2024-09-20 DIAGNOSIS — B37.31 VAGINA, CANDIDIASIS: Primary | ICD-10-CM

## 2024-09-20 LAB
CANDIDA RRNA VAG QL PROBE: DETECTED
G VAGINALIS RRNA GENITAL QL PROBE: NOT DETECTED
T VAGINALIS RRNA GENITAL QL PROBE: NOT DETECTED

## 2024-09-20 RX ORDER — FLUCONAZOLE 150 MG/1
150 TABLET ORAL ONCE
Qty: 1 TABLET | Refills: 0 | Status: SHIPPED | OUTPATIENT
Start: 2024-09-20 | End: 2024-09-20

## 2024-09-21 ENCOUNTER — APPOINTMENT (OUTPATIENT)
Dept: LAB | Facility: HOSPITAL | Age: 34
End: 2024-09-21
Payer: COMMERCIAL

## 2024-09-21 DIAGNOSIS — Z13.220 SCREENING CHOLESTEROL LEVEL: ICD-10-CM

## 2024-09-21 DIAGNOSIS — F41.9 ANXIETY AND DEPRESSION: ICD-10-CM

## 2024-09-21 DIAGNOSIS — F32.A ANXIETY AND DEPRESSION: ICD-10-CM

## 2024-09-21 LAB
25(OH)D3 SERPL-MCNC: 28.6 NG/ML (ref 30–100)
ALBUMIN SERPL BCG-MCNC: 4.1 G/DL (ref 3.5–5)
ALP SERPL-CCNC: 65 U/L (ref 34–104)
ALT SERPL W P-5'-P-CCNC: 6 U/L (ref 7–52)
ANION GAP SERPL CALCULATED.3IONS-SCNC: 8 MMOL/L (ref 4–13)
AST SERPL W P-5'-P-CCNC: 12 U/L (ref 13–39)
BACTERIA UR CULT: ABNORMAL
BASOPHILS # BLD AUTO: 0.03 THOUSANDS/ΜL (ref 0–0.1)
BASOPHILS NFR BLD AUTO: 1 % (ref 0–1)
BILIRUB SERPL-MCNC: 0.67 MG/DL (ref 0.2–1)
BUN SERPL-MCNC: 11 MG/DL (ref 5–25)
CALCIUM SERPL-MCNC: 9.1 MG/DL (ref 8.4–10.2)
CHLORIDE SERPL-SCNC: 107 MMOL/L (ref 96–108)
CHOLEST SERPL-MCNC: 183 MG/DL
CO2 SERPL-SCNC: 23 MMOL/L (ref 21–32)
CREAT SERPL-MCNC: 0.5 MG/DL (ref 0.6–1.3)
EOSINOPHIL # BLD AUTO: 0.03 THOUSAND/ΜL (ref 0–0.61)
EOSINOPHIL NFR BLD AUTO: 1 % (ref 0–6)
ERYTHROCYTE [DISTWIDTH] IN BLOOD BY AUTOMATED COUNT: 13 % (ref 11.6–15.1)
GFR SERPL CREATININE-BSD FRML MDRD: 125 ML/MIN/1.73SQ M
GLUCOSE P FAST SERPL-MCNC: 86 MG/DL (ref 65–99)
HCT VFR BLD AUTO: 39.5 % (ref 34.8–46.1)
HDLC SERPL-MCNC: 78 MG/DL
HGB BLD-MCNC: 12.6 G/DL (ref 11.5–15.4)
IMM GRANULOCYTES # BLD AUTO: 0.02 THOUSAND/UL (ref 0–0.2)
IMM GRANULOCYTES NFR BLD AUTO: 0 % (ref 0–2)
LDLC SERPL CALC-MCNC: 94 MG/DL (ref 0–100)
LYMPHOCYTES # BLD AUTO: 2.17 THOUSANDS/ΜL (ref 0.6–4.47)
LYMPHOCYTES NFR BLD AUTO: 42 % (ref 14–44)
MCH RBC QN AUTO: 30.2 PG (ref 26.8–34.3)
MCHC RBC AUTO-ENTMCNC: 31.9 G/DL (ref 31.4–37.4)
MCV RBC AUTO: 95 FL (ref 82–98)
MONOCYTES # BLD AUTO: 0.35 THOUSAND/ΜL (ref 0.17–1.22)
MONOCYTES NFR BLD AUTO: 7 % (ref 4–12)
NEUTROPHILS # BLD AUTO: 2.6 THOUSANDS/ΜL (ref 1.85–7.62)
NEUTS SEG NFR BLD AUTO: 49 % (ref 43–75)
NONHDLC SERPL-MCNC: 105 MG/DL
NRBC BLD AUTO-RTO: 0 /100 WBCS
PLATELET # BLD AUTO: 358 THOUSANDS/UL (ref 149–390)
PMV BLD AUTO: 9.8 FL (ref 8.9–12.7)
POTASSIUM SERPL-SCNC: 3.7 MMOL/L (ref 3.5–5.3)
PROT SERPL-MCNC: 7 G/DL (ref 6.4–8.4)
RBC # BLD AUTO: 4.17 MILLION/UL (ref 3.81–5.12)
SODIUM SERPL-SCNC: 138 MMOL/L (ref 135–147)
TRIGL SERPL-MCNC: 53 MG/DL
TSH SERPL DL<=0.05 MIU/L-ACNC: 1.91 UIU/ML (ref 0.45–4.5)
VIT B12 SERPL-MCNC: 216 PG/ML (ref 180–914)
WBC # BLD AUTO: 5.2 THOUSAND/UL (ref 4.31–10.16)

## 2024-09-21 PROCEDURE — 85025 COMPLETE CBC W/AUTO DIFF WBC: CPT

## 2024-09-21 PROCEDURE — 80053 COMPREHEN METABOLIC PANEL: CPT

## 2024-09-21 PROCEDURE — 36415 COLL VENOUS BLD VENIPUNCTURE: CPT

## 2024-09-21 PROCEDURE — 82306 VITAMIN D 25 HYDROXY: CPT

## 2024-09-21 PROCEDURE — 80061 LIPID PANEL: CPT

## 2024-09-21 PROCEDURE — 84443 ASSAY THYROID STIM HORMONE: CPT

## 2024-09-21 PROCEDURE — 82607 VITAMIN B-12: CPT

## 2024-12-16 ENCOUNTER — TELEPHONE (OUTPATIENT)
Dept: OBGYN CLINIC | Facility: CLINIC | Age: 34
End: 2024-12-16